# Patient Record
Sex: MALE | Race: WHITE | NOT HISPANIC OR LATINO | Employment: FULL TIME | URBAN - METROPOLITAN AREA
[De-identification: names, ages, dates, MRNs, and addresses within clinical notes are randomized per-mention and may not be internally consistent; named-entity substitution may affect disease eponyms.]

---

## 2017-01-18 RX ORDER — ATORVASTATIN CALCIUM 20 MG/1
TABLET, FILM COATED ORAL
Qty: 90 TABLET | Refills: 0 | Status: SHIPPED | OUTPATIENT
Start: 2017-01-18 | End: 2017-05-03 | Stop reason: SDUPTHER

## 2017-04-17 RX ORDER — CYCLOBENZAPRINE HCL 10 MG
10 TABLET ORAL 3 TIMES DAILY PRN
Qty: 30 TABLET | Refills: 0 | Status: SHIPPED | OUTPATIENT
Start: 2017-04-17 | End: 2017-04-27

## 2017-04-17 RX ORDER — DICLOFENAC SODIUM 75 MG/1
75 TABLET, DELAYED RELEASE ORAL 2 TIMES DAILY
Qty: 30 TABLET | Refills: 0 | Status: SHIPPED | OUTPATIENT
Start: 2017-04-17 | End: 2017-12-06

## 2017-05-04 DIAGNOSIS — E78.5 HYPERLIPIDEMIA, UNSPECIFIED HYPERLIPIDEMIA TYPE: Primary | ICD-10-CM

## 2017-05-04 RX ORDER — ATORVASTATIN CALCIUM 20 MG/1
TABLET, FILM COATED ORAL
Qty: 90 TABLET | Refills: 0 | Status: SHIPPED | OUTPATIENT
Start: 2017-05-04 | End: 2017-07-30 | Stop reason: SDUPTHER

## 2017-08-01 RX ORDER — ATORVASTATIN CALCIUM 20 MG/1
TABLET, FILM COATED ORAL
Qty: 90 TABLET | Refills: 0 | Status: SHIPPED | OUTPATIENT
Start: 2017-08-01 | End: 2017-08-09 | Stop reason: SDUPTHER

## 2017-08-08 LAB
ALBUMIN SERPL-MCNC: 4.2 G/DL (ref 3.6–5.1)
ALBUMIN/GLOB SERPL: 1.6 (CALC) (ref 1–2.5)
ALP SERPL-CCNC: 63 U/L (ref 40–115)
ALT SERPL-CCNC: 29 U/L (ref 9–46)
AST SERPL-CCNC: 21 U/L (ref 10–40)
BILIRUB SERPL-MCNC: 0.9 MG/DL (ref 0.2–1.2)
BUN SERPL-MCNC: 22 MG/DL (ref 7–25)
BUN/CREAT SERPL: ABNORMAL (CALC) (ref 6–22)
CALCIUM SERPL-MCNC: 9.1 MG/DL (ref 8.6–10.3)
CHLORIDE SERPL-SCNC: 103 MMOL/L (ref 98–110)
CHOLEST SERPL-MCNC: 196 MG/DL (ref 125–200)
CHOLEST/HDLC SERPL: 4 (CALC)
CO2 SERPL-SCNC: 29 MMOL/L (ref 20–31)
CREAT SERPL-MCNC: 1.06 MG/DL (ref 0.6–1.35)
GFR SERPL CREATININE-BSD FRML MDRD: 83 ML/MIN/1.73M2
GLOBULIN SER CALC-MCNC: 2.6 G/DL (CALC) (ref 1.9–3.7)
GLUCOSE SERPL-MCNC: 108 MG/DL (ref 65–99)
HDLC SERPL-MCNC: 49 MG/DL
LDLC SERPL CALC-MCNC: 119 MG/DL (CALC)
NONHDLC SERPL-MCNC: 147 MG/DL (CALC)
POTASSIUM SERPL-SCNC: 4.1 MMOL/L (ref 3.5–5.3)
PROT SERPL-MCNC: 6.8 G/DL (ref 6.1–8.1)
SODIUM SERPL-SCNC: 140 MMOL/L (ref 135–146)
TRIGL SERPL-MCNC: 141 MG/DL

## 2017-08-09 RX ORDER — ATORVASTATIN CALCIUM 20 MG/1
20 TABLET, FILM COATED ORAL DAILY
Qty: 90 TABLET | Refills: 3 | Status: SHIPPED | OUTPATIENT
Start: 2017-08-09 | End: 2018-07-05 | Stop reason: DRUGHIGH

## 2017-12-01 RX ORDER — FLUTICASONE PROPIONATE 50 MCG
1 SPRAY, SUSPENSION (ML) NASAL DAILY
Qty: 16 G | Refills: 0 | Status: SHIPPED | OUTPATIENT
Start: 2017-12-01 | End: 2017-12-06

## 2017-12-01 RX ORDER — ALBUTEROL SULFATE 90 UG/1
2 AEROSOL, METERED RESPIRATORY (INHALATION) EVERY 6 HOURS PRN
Qty: 18 G | Refills: 0 | Status: SHIPPED | OUTPATIENT
Start: 2017-12-01 | End: 2018-06-14

## 2017-12-01 RX ORDER — BENZONATATE 200 MG/1
200 CAPSULE ORAL 3 TIMES DAILY PRN
Qty: 30 CAPSULE | Refills: 0 | Status: SHIPPED | OUTPATIENT
Start: 2017-12-01 | End: 2017-12-06

## 2017-12-04 ENCOUNTER — TELEPHONE (OUTPATIENT)
Dept: FAMILY MEDICINE | Facility: CLINIC | Age: 47
End: 2017-12-04

## 2017-12-04 NOTE — TELEPHONE ENCOUNTER
----- Message from Jordan Candelario sent at 12/4/2017  9:46 AM CST -----  Contact: pt's wife  The pt states that he is a past patient of Dr. Whipple.  His medical record does not support him being a patient of Dr. Whipple.  The pt's wife was informed that Dr. Whipple is currently not seeing new patients.  She then requested to have a message sent to the staff.  The pt can be reach [phone]

## 2017-12-04 NOTE — TELEPHONE ENCOUNTER
----- Message from Mathew Toribio sent at 12/4/2017  3:12 PM CST -----  Contact: [phone]  Returning nurse call. Please call anshul at [phone] thx lj

## 2017-12-04 NOTE — TELEPHONE ENCOUNTER
Called pt, no answer or voicemail. Scheduled pt for Dr. Whipple's next available Wednesday 12/13/17 @ 9:00.

## 2017-12-04 NOTE — TELEPHONE ENCOUNTER
Let him know that [facility] currently considers people who dont see me in 3 years as a new patient and since I don't take new patients, it causes problems with booking patients.     Schedule him a new patient appt Wed to see me.

## 2017-12-06 ENCOUNTER — OFFICE VISIT (OUTPATIENT)
Dept: FAMILY MEDICINE | Facility: CLINIC | Age: 47
End: 2017-12-06
Payer: COMMERCIAL

## 2017-12-06 VITALS
WEIGHT: 214.75 LBS | SYSTOLIC BLOOD PRESSURE: 138 MMHG | TEMPERATURE: 99 F | HEIGHT: 68 IN | HEART RATE: 79 BPM | BODY MASS INDEX: 32.55 KG/M2 | DIASTOLIC BLOOD PRESSURE: 93 MMHG

## 2017-12-06 DIAGNOSIS — R03.0 ELEVATED BLOOD PRESSURE READING: ICD-10-CM

## 2017-12-06 DIAGNOSIS — S01.81XA LACERATION OF MULTIPLE SITES OF FACE: ICD-10-CM

## 2017-12-06 DIAGNOSIS — J18.9 PNEUMONIA OF RIGHT LOWER LOBE DUE TO INFECTIOUS ORGANISM: Primary | ICD-10-CM

## 2017-12-06 DIAGNOSIS — S02.2XXD CLOSED FRACTURE OF NASAL BONE WITH ROUTINE HEALING: ICD-10-CM

## 2017-12-06 PROBLEM — E78.2 MIXED HYPERLIPIDEMIA: Status: ACTIVE | Noted: 2017-12-06

## 2017-12-06 PROCEDURE — 94640 AIRWAY INHALATION TREATMENT: CPT | Mod: S$GLB,,, | Performed by: FAMILY MEDICINE

## 2017-12-06 PROCEDURE — 99203 OFFICE O/P NEW LOW 30 MIN: CPT | Mod: S$GLB,,, | Performed by: NURSE PRACTITIONER

## 2017-12-06 PROCEDURE — 99999 PR PBB SHADOW E&M-EST. PATIENT-LVL IV: CPT | Mod: PBBFAC,,, | Performed by: NURSE PRACTITIONER

## 2017-12-06 RX ORDER — HYDROCODONE BITARTRATE AND ACETAMINOPHEN 5; 325 MG/1; MG/1
TABLET ORAL
COMMUNITY
Start: 2017-12-04 | End: 2017-12-06

## 2017-12-06 RX ORDER — PREDNISONE 50 MG/1
TABLET ORAL
COMMUNITY
Start: 2017-12-04 | End: 2017-12-06

## 2017-12-06 RX ORDER — AMOXICILLIN AND CLAVULANATE POTASSIUM 500; 125 MG/1; MG/1
500 TABLET, FILM COATED ORAL
COMMUNITY
Start: 2017-12-05 | End: 2017-12-15

## 2017-12-06 RX ORDER — ALBUTEROL SULFATE 0.63 MG/3ML
0.63 SOLUTION RESPIRATORY (INHALATION)
Status: COMPLETED | OUTPATIENT
Start: 2017-12-06 | End: 2017-12-06

## 2017-12-06 RX ORDER — CEPHALEXIN 500 MG/1
CAPSULE ORAL
Refills: 0 | COMMUNITY
Start: 2017-11-27 | End: 2017-12-06 | Stop reason: ALTCHOICE

## 2017-12-06 RX ORDER — PREDNISONE 50 MG/1
50 TABLET ORAL
COMMUNITY
Start: 2017-12-04 | End: 2017-12-06 | Stop reason: ALTCHOICE

## 2017-12-06 RX ORDER — HYDROCODONE BITARTRATE AND ACETAMINOPHEN 5; 325 MG/1; MG/1
1 TABLET ORAL
COMMUNITY
Start: 2017-12-04 | End: 2017-12-14

## 2017-12-06 RX ORDER — METHYLPREDNISOLONE 4 MG/1
TABLET ORAL
COMMUNITY
Start: 2017-12-05 | End: 2017-12-13 | Stop reason: ALTCHOICE

## 2017-12-06 RX ORDER — AMOXICILLIN AND CLAVULANATE POTASSIUM 500; 125 MG/1; MG/1
TABLET, FILM COATED ORAL
COMMUNITY
Start: 2017-12-05 | End: 2017-12-06 | Stop reason: SDUPTHER

## 2017-12-06 RX ORDER — FLUTICASONE PROPIONATE AND SALMETEROL 250; 50 UG/1; UG/1
1 POWDER RESPIRATORY (INHALATION)
COMMUNITY
Start: 2017-12-05 | End: 2018-06-14

## 2017-12-06 RX ORDER — METHYLPREDNISOLONE 4 MG/1
TABLET ORAL
COMMUNITY
Start: 2017-12-05 | End: 2017-12-06

## 2017-12-06 RX ORDER — FLUTICASONE PROPIONATE AND SALMETEROL 50; 250 UG/1; UG/1
POWDER RESPIRATORY (INHALATION)
COMMUNITY
Start: 2017-12-05 | End: 2017-12-13 | Stop reason: SDUPTHER

## 2017-12-06 RX ORDER — ALBUTEROL SULFATE 0.63 MG/3ML
0.63 SOLUTION RESPIRATORY (INHALATION) EVERY 6 HOURS PRN
Qty: 1 BOX | Refills: 0 | Status: SHIPPED | OUTPATIENT
Start: 2017-12-06 | End: 2018-06-14

## 2017-12-06 RX ORDER — MULTIVITAMIN
1 TABLET ORAL
COMMUNITY

## 2017-12-06 RX ORDER — CEPHALEXIN 500 MG/1
500 CAPSULE ORAL
COMMUNITY
Start: 2017-11-27 | End: 2017-12-06 | Stop reason: ALTCHOICE

## 2017-12-06 RX ORDER — AZITHROMYCIN 250 MG/1
TABLET, FILM COATED ORAL
COMMUNITY
Start: 2017-12-04 | End: 2017-12-13 | Stop reason: ALTCHOICE

## 2017-12-06 RX ADMIN — ALBUTEROL SULFATE 0.63 MG: 0.63 SOLUTION RESPIRATORY (INHALATION) at 09:12

## 2017-12-06 NOTE — PROGRESS NOTES
Subjective:       Patient ID: Rupert Mir is a 46 y.o. male.    Chief Complaint: Follow-up    Pneumonia   He complains of cough, difficulty breathing and wheezing. There is no shortness of breath. This is a new problem. The current episode started 1 to 4 weeks ago. The problem occurs constantly. The problem has been unchanged. The cough is non-productive. Pertinent negatives include no chest pain, ear pain, fever, headaches, myalgias or sore throat. His symptoms are aggravated by any activity. His symptoms are alleviated by nothing. He reports no (advair, z-silvia, prednisone, augmentin) improvement on treatment.   Hypertension   This is a chronic problem. The current episode started more than 1 year ago. The problem is unchanged. Condition status: 140/80-90. Pertinent negatives include no blurred vision, chest pain, headaches, palpitations or shortness of breath. Past treatments include nothing.   Facial Injury    Incident onset: 12/3/17 syncopal episode, fell hitting face on wood floor. He lost consciousness for a period of less than one minute. The volume of blood lost was moderate. The pain is mild. Pertinent negatives include no blurred vision, disorientation, headaches, memory loss, numbness or vomiting. Associated symptoms comments: Laceration to nose and above right eyebrow, nasal fracture. Treatments tried: seen by ENT.   Loss of Consciousness   This is a new problem. Episode onset: 12/3/17. He lost consciousness for a period of less than 1 minute. Exacerbated by: pt had a very deep cough while standing from a chair and passed out  Pertinent negatives include no abdominal pain, chest pain, dizziness, fever, headaches, nausea, palpitations or vomiting. Treatments tried: seen in ER, diagnosed with vagal reaction.       Review of Systems   Constitutional: Negative for fatigue, fever and unexpected weight change.   HENT: Positive for congestion and facial swelling ( nasal fracture). Negative for ear  pain and sore throat.    Eyes: Negative.  Negative for blurred vision, pain and visual disturbance.   Respiratory: Positive for cough and wheezing. Negative for shortness of breath.    Cardiovascular: Positive for syncope. Negative for chest pain and palpitations.   Gastrointestinal: Negative for abdominal pain, diarrhea, nausea and vomiting.   Genitourinary: Negative for dysuria and frequency.   Musculoskeletal: Negative for arthralgias and myalgias.   Skin: Positive for wound. Negative for color change and rash.   Neurological: Negative for dizziness, numbness and headaches.   Psychiatric/Behavioral: Negative for memory loss and sleep disturbance. The patient is not nervous/anxious.        Vitals:    12/06/17 0803   BP: (!) 138/93   Pulse: 79   Temp: 98.6 °F (37 °C)       Objective:     Current Outpatient Prescriptions   Medication Sig Dispense Refill    albuterol 90 mcg/actuation inhaler Inhale 2 puffs into the lungs every 6 (six) hours as needed. 18 g 0    atorvastatin (LIPITOR) 20 MG tablet Take 1 tablet (20 mg total) by mouth once daily. 90 tablet 3    fluticasone-salmeterol 250-50 mcg/dose (ADVAIR) 250-50 mcg/dose diskus inhaler Inhale 1 puff into the lungs.      multivitamin (THERAGRAN) per tablet Take 1 tablet by mouth.      albuterol (ACCUNEB) 0.63 mg/3 mL Nebu Take 3 mLs (0.63 mg total) by nebulization every 6 (six) hours as needed. Rescue 1 Box 0    methylPREDNISolone (MEDROL DOSEPACK) 4 mg tablet follow package directions 21 tablet 0     No current facility-administered medications for this visit.        Physical Exam   Constitutional: He is oriented to person, place, and time. He appears well-developed. No distress.   HENT:   Head: Normocephalic. Head is with raccoon's eyes.   Eyes: EOM are normal. Pupils are equal, round, and reactive to light.   Neck: Normal range of motion. Neck supple.   Cardiovascular: Normal rate and regular rhythm.    Pulmonary/Chest: Effort normal. He has wheezes in the  right upper field, the right middle field and the right lower field. He has rhonchi in the right lower field. He has rales in the right lower field.   Musculoskeletal: Normal range of motion.   Neurological: He is alert and oriented to person, place, and time.   Skin: Skin is warm and dry. Laceration ( to face) noted. No rash noted.   Psychiatric: He has a normal mood and affect. Thought content normal.   Nursing note and vitals reviewed.      Assessment:       1. Pneumonia of right lower lobe due to infectious organism    2. Elevated blood pressure reading    3. Closed fracture of nasal bone with routine healing    4. Laceration of multiple sites of face        Plan:   Pneumonia of right lower lobe due to infectious organism  -     albuterol nebulizer solution 0.63 mg; Take 3 mLs (0.63 mg total) by nebulization one time.    Elevated blood pressure reading    Closed fracture of nasal bone with routine healing    Laceration of multiple sites of face    Other orders  -     albuterol (ACCUNEB) 0.63 mg/3 mL Nebu; Take 3 mLs (0.63 mg total) by nebulization every 6 (six) hours as needed. Rescue  Dispense: 1 Box; Refill: 0    continue current meds  Keep appointment for follow up with ENT    Return if symptoms worsen or fail to improve.

## 2017-12-13 ENCOUNTER — OFFICE VISIT (OUTPATIENT)
Dept: FAMILY MEDICINE | Facility: CLINIC | Age: 47
End: 2017-12-13
Payer: COMMERCIAL

## 2017-12-13 VITALS
TEMPERATURE: 98 F | SYSTOLIC BLOOD PRESSURE: 135 MMHG | HEART RATE: 88 BPM | HEIGHT: 68 IN | BODY MASS INDEX: 32.61 KG/M2 | DIASTOLIC BLOOD PRESSURE: 95 MMHG | WEIGHT: 215.19 LBS | OXYGEN SATURATION: 95 %

## 2017-12-13 DIAGNOSIS — J20.8 ACUTE BRONCHITIS DUE TO OTHER SPECIFIED ORGANISMS: Primary | ICD-10-CM

## 2017-12-13 DIAGNOSIS — E66.9 OBESITY (BMI 30-39.9): ICD-10-CM

## 2017-12-13 DIAGNOSIS — E78.2 MIXED HYPERLIPIDEMIA: ICD-10-CM

## 2017-12-13 DIAGNOSIS — R03.0 ELEVATED BLOOD PRESSURE READING IN OFFICE WITHOUT DIAGNOSIS OF HYPERTENSION: ICD-10-CM

## 2017-12-13 DIAGNOSIS — S02.2XXD CLOSED FRACTURE OF NASAL BONE WITH ROUTINE HEALING: ICD-10-CM

## 2017-12-13 PROCEDURE — 99999 PR PBB SHADOW E&M-EST. PATIENT-LVL IV: CPT | Mod: PBBFAC,,, | Performed by: FAMILY MEDICINE

## 2017-12-13 PROCEDURE — 90686 IIV4 VACC NO PRSV 0.5 ML IM: CPT | Mod: S$GLB,,, | Performed by: FAMILY MEDICINE

## 2017-12-13 PROCEDURE — 99214 OFFICE O/P EST MOD 30 MIN: CPT | Mod: 25,S$GLB,, | Performed by: FAMILY MEDICINE

## 2017-12-13 PROCEDURE — 90471 IMMUNIZATION ADMIN: CPT | Mod: S$GLB,,, | Performed by: FAMILY MEDICINE

## 2017-12-13 RX ORDER — METHYLPREDNISOLONE 4 MG/1
TABLET ORAL
Qty: 21 TABLET | Refills: 0 | Status: SHIPPED | OUTPATIENT
Start: 2017-12-13 | End: 2018-01-19

## 2017-12-13 NOTE — PATIENT INSTRUCTIONS
The Dash diet has been shown to help people lower blood pressures and weight.  Copy and go to the link below to learn more about the diet and to use the helpful links from the [employer] to learn more about how to implement the diet.     [email or web address]-diet-[email or web address]-control-high-blood-[email or web address]

## 2017-12-13 NOTE — PROGRESS NOTES
Subjective:      Patient ID: Rupert Mir is a 46 y.o. male.    Chief Complaint: f/u from ER visit.    HPI     He went to the ER after having a cough and syncope and a fracture of the nose with a laceration recetnly and he was seen at [facility].     ED Provider Notes - Gianluca Salcido MD - 12/04/2017 12:27 AM CST  Associated Order(s): ED LACERATION REPAIR; ED LACERATION REPAIR    Formatting of this note may be different from the original.    Triage Note Reviewed    History     Chief Complaint   Patient presents with    Fall    Laceration     History of Present IllnessChaquiles Mir is a 46 y.o. male with no significant medical history, presenting with an episode of passing out.    Patient has been sick for the last week with an upper respiratory infection. He was seen in a walking clinic and prescribed an antibiotic which she's been taking. He thought he was getting better yesterday but then today it worsening cough. He was sitting in a chair and had a coughing fit and during the coughing fit he passed out and fell forward. He doesn't think that he lost consciousness and he immediately stood up. His wife witnessed it and agrees with the history provided. Patient never had chest pain or shortness of breath. He's been using albuterol at home and has been wheezing for the last several days. He was not given any steroids.    I reviewed the patient's medical chart which shows recent seen in clinic about a week ago, given Keflex for an upper respiratory infection.    History provided by patient and his wife    The pain is located in his nose and head. Pain is 7/10 and throbbing in character. The pain radiates across his head. Nothing makes it better. Coughing makes it worse. There are associated symptoms of cough, congestion, fever, syncope.     At least 4 HPI elements are present above     Review of Systems   Constitutional: Positive for activity change, diaphoresis, fatigue and fever. Negative for  "appetite change.   HENT: Positive for congestion. Negative for drooling.   Eyes: Negative for discharge and redness.   Respiratory: Positive for cough, shortness of breath and wheezing. Negative for chest tightness.   Cardiovascular: Negative for chest pain and palpitations.   Gastrointestinal: Negative for abdominal pain, nausea and vomiting.   Genitourinary: Negative for dysuria, flank pain and frequency.   Musculoskeletal: Negative for back pain and neck pain.   Skin: Negative for rash and wound.   Neurological: Positive for syncope. Negative for dizziness, seizures and headaches.   Psychiatric/Behavioral: Negative for agitation and behavioral problems.         No Known Allergies    Past Medical History:   Diagnosis Date    Hyperlipidemia     No past surgical history on file.     Family History   Problem Relation Age of Onset    No Known Problems Mother    No Known Problems Father     Social History   Substance Use Topics    Smoking status: Never Smoker    Smokeless tobacco: Never Used    Alcohol use Yes   Comment: Rare use     Physical Exam     Visit Vitals  BP (!) 167/109 (BP Location: Right arm, Patient Position: Sitting)   Pulse 85   Temp 98.8 °F (37.1 °C) (Oral)   Resp 17   Ht 5' 8" (1.727 m)   Wt 215 lb (97.5 kg)   SpO2 100%   BMI 32.69 kg/m²     Physical Exam   Constitutional: He is oriented to person, place, and time. He appears well-developed and well-nourished. No distress.   HENT:   Head: Normocephalic.   Right Ear: External ear normal.   Left Ear: External ear normal.   Nose: Nose normal.   Mouth/Throat: Oropharynx is clear and moist. No oropharyngeal exudate.   4cm laceration to right frontal scalp just above the eyebrow, small 0.5 cm laceration to nasal bridge   Eyes: Conjunctivae and EOM are normal. Pupils are equal, round, and reactive to light. Right eye exhibits no discharge. Left eye exhibits no discharge.   EOMs intact, no proptosis, pupils equal round reactive to light. No hyphema. No " eyelid laceration   Neck: Normal range of motion. Neck supple.   No midline cervical tenderness, full range of motion in the neck   Cardiovascular: Normal rate, regular rhythm, normal heart sounds and intact distal pulses. Exam reveals no gallop and no friction rub.   No murmur heard.  Pulmonary/Chest: Effort normal. No stridor. No respiratory distress. He has wheezes. He has rales. He exhibits no tenderness.   Effusion wheezes but good air movement throughout, rales in the right lung base. No respiratory distress. No chest or muscle use. Normal respiratory rate   Abdominal: Soft. He exhibits no distension and no mass. There is no tenderness. There is no rebound and no guarding.   Musculoskeletal: Normal range of motion. He exhibits no edema, tenderness or deformity.   Neurological: He is alert and oriented to person, place, and time. No cranial nerve deficit. He exhibits normal muscle tone.   Skin: Skin is warm and dry. No rash noted. He is not diaphoretic. No erythema. No pallor.   Psychiatric: He has a normal mood and affect. His behavior is normal.   Nursing note and vitals reviewed.    ED Course     Labs Reviewed   CBC WITH DIFFERENTIAL - Abnormal; Notable for the following:   Result Value   WBC 11.9 (*)   Neutrophils Absolute 7.5 (*)   All other components within normal limits   BMP - Abnormal; Notable for the following:   Glucose 124 (*)   Calcium 8.6 (*)   All other components within normal limits   TROPONIN I   GLOMERULAR FILTRATION RATE     Lab Results for last 36Hrs:  Recent Results (from the past 36 hour(s))   CBC with Differential   Collection Time: 12/03/17 11:30 PM   Result Value Ref Range   WBC 11.9 (H) 4.8 - 10.8 10*3/uL   RBC 5.11 4.70 - 6.10 10*6/uL   HGB 14.6 14.0 - 18.0 g/dL   HCT 43.9 42.0 - 52.0 %   MCV 85.9 80.0 - 94.0 fL   MCH 28.6 27.0 - 31.0 pg   MCHC 33.3 33.0 - 37.0 g/dL   RDW 13.1 11.5 - 14.5 %   Platelet Count 269 130 - 400 10*3/uL   MPV 7.6 7.4 - 10.4 fL   Neutrophils Percent 63.0  36.0 - 66.0 %   Lymphocytes Percent 24.0 21.0 - 50.0 %   Monocytes Percent 8.0 2.0 - 10.0 %   Eosinophils Percent 4.0 0.0 - 10.0 %   Basophils Percent 1.0 0.0 - 1.0 %   Neutrophils Absolute 7.5 (H) 1.4 - 6.5 10*3/uL   Lymphocytes Absolute 2.9 1.2 - 3.4 10*3/uL   Monocytes Absolute 0.9 0.1 - 1.0 10*3/uL   Eosinophils Absolute 0.5 0.0 - 0.7 10*3/uL   Basophils Absolute 0.1 0.0 - 0.2 10*3/uL   Basic metabolic panel   Collection Time: 12/03/17 11:30 PM   Result Value Ref Range   Glucose 124 (H) 65 - 99 mg/dL   Sodium 138 136 - 144 mmol/L   Potassium 3.7 3.6 - 5.1 mmol/L   Chloride 104 101 - 111 mmol/L   CO2 26 22 - 32 mmol/L   BUN 15 8 - 20 mg/dL   Calcium 8.6 (L) 8.9 - 10.3 mg/dL   Creatinine 1.19 0.90 - 1.30 mg/dL   Anion Gap 8 7 - 16 mmol/L   Troponin I   Collection Time: 12/03/17 11:30 PM   Result Value Ref Range   Troponin I <0.03 0.00 - 0.04 ng/mL   Glomerular Filtration Rate   Collection Time: 12/03/17 11:30 PM   Result Value Ref Range   GFR Non African American >60 >59 mL/min   GFR African American >60 >59 mL/min     Diagnostic Results for last 36Hrs:  No results found.    Wet Read Results  XR Chest AP Portable (Results Pending)   CT Head WO Contrast (Results Pending)   CT Maxillofacial WO Contrast (Results Pending)     Laceration  Date/Time: 12/4/2017 12:40 AM  Performed by: [employer]  Authorized by: [employer]     Anesthesia (see MAR for exact dosages):   Anesthesia method: Local infiltration  Local anesthetic: Lidocaine 2% w/o epi  Sedation:   Patient sedated?: No   Laceration details:   Location: Scalp  Scalp location: Frontal  Length (cm): 4  Depth (mm): 1  Repair type:   Repair type: Simple  Pre-procedure details:   Preparation: Patient was prepped and draped in usual sterile fashion  Exploration:   Hemostasis achieved with: Direct pressure  Wound exploration: wound explored through full range of motion and entire depth of wound probed and visualized   Wound extent: no areolar tissue violation  noted, no fascia violation noted, no foreign bodies/material noted, no muscle damage noted, no nerve damage noted, no tendon damage noted, no underlying fracture noted and no vascular damage noted   Contaminated: no   Treatment:   Area cleansed with: Betadine  Amount of cleaning: Standard  Irrigation solution: Sterile saline  Irrigation volume: 250cc  Irrigation method: Syringe and pressure wash  Visualized foreign bodies/material removed: no   Skin repair:   Repair method: Sutures  Suture size: 4-0  Suture material: Nylon  Suture technique: Simple interrupted  Number of sutures: 7  Approximation:   Approximation: Close  Post-procedure details:   Dressing: Antibiotic ointment  Patient tolerance of procedure: Tolerated well, no immediate complications  Laceration  Date/Time: 12/4/2017 12:42 AM  Performed by: [employer]  Authorized by: [employer]     Anesthesia (see MAR for exact dosages):   Anesthesia method: Local infiltration  Local anesthetic: Lidocaine 2% w/o epi  Sedation:   Patient sedated?: No   Laceration details:   Location: Face  Face location: Nose  Length (cm): 0.5  Depth (mm): 1  Repair type:   Repair type: Simple  Pre-procedure details:   Preparation: Patient was prepped and draped in usual sterile fashion  Exploration:   Hemostasis achieved with: Direct pressure  Wound exploration: wound explored through full range of motion and entire depth of wound probed and visualized   Wound extent: no areolar tissue violation noted, no fascia violation noted, no foreign bodies/material noted, no muscle damage noted, no nerve damage noted, no tendon damage noted, no underlying fracture noted and no vascular damage noted   Contaminated: no   Treatment:   Area cleansed with: Betadine  Amount of cleaning: Standard  Irrigation solution: Sterile saline  Irrigation volume: 250  Irrigation method: Pressure wash and syringe  Visualized foreign bodies/material removed: no   Skin repair:   Repair method:  Sutures  Suture size: 4-0  Suture material: Nylon  Suture technique: Simple interrupted  Number of sutures: 2  Approximation:   Approximation: Close  Post-procedure details:   Dressing: Antibiotic ointment  Patient tolerance of procedure: Tolerated well, no immediate complications    ED Course     MDM  Number of Diagnoses or Management Options  Closed fracture of nasal bone, initial encounter: new and requires workup  Closed fracture of nasal septum, initial encounter: new and requires workup  Community acquired pneumonia of right lower lobe of lung: new and requires workup  Facial laceration, initial encounter: new and requires workup  Fall in home, initial encounter: new and requires workup  Vasovagal syncope: new and requires workup  Diagnosis management comments: Rupert Mir is a 46 y.o. male presenting with syncopal episode following a coughing fit most concerning for vasovagal syncope. Patient without history of CHF, no history of a structurally abnormal heart, patient very low risk from a cardiovascular standpoint in regards to syncope. Patient had no chest pain or shortness of breath during the episode. No concern for ACS. PERC negative for PE. Patient's wife very concerned about intracranial hemorrhage as well as nasal bone fracture, I explained that this is low risk given the mechanism and explained that things necessary but she remained very concerned so we'll get a CT his head and facial bones. I will repair the laceration. Tetanus will be given. We will do EKG and labs for syncope although the history is very consistent with vasovagal. Patient with wheezing on exam, ongoing upper respiratory illness, patient with rales in his right lung base concerning for pneumonia. We'll do chest x-ray to assess for pneumonia. Duo nebs and Solu-Medrol for wheezing. Reassess following initial workup.    I independently interpreted the patient's lab results which showed mild leukocytosis but expected with  ongoing URI.     I independently interpreted the patient's ECG which showed NSR, rate 86, nl axis, no hypertrophy, nl ST segments, nl T-waves. Normal ecg    No evidence of short SC or slurred initial QRS demonstrating WPW  No ST segment changes in V1-V3 suggestive of Brugada syndrome.  No significant prolonged QT suggestive of congenital or acquired prolonged QT syndromes  No Q waves in a septal distribution suggestive of HOCM  No epsilon waves in V1-V3 suggestive of ARVD    A pulse oximetry was measured and was recorded as 100% which is normal    I have personally visualized and interpreted the patient's CXR and determined that it demonstrates no signs of cardiomegaly, costophrenic blunting, hyperinflation, pulmonary infiltrates, pneumothorax, or tracheal deviation. Cardiac silhouette is well visualized. Julio-diaphragms clear. Bony structures without break or fractures.     CT head negative. CT facial bones with nasal bone and nasal septum fracture. Nondisplaced. Chest x-ray without focal consolidation but patient clinically concerning for pneumonia with rales on exam focally. He was treated with a suboptimal antibiotic choice with Keflex. We'll treat with steroids and azithromycin. Norco for nasal bone fracture. Patient with much improveed wheezing following treatment in the emergency department. Given follow-up with primary care physician this week.      Amount and/or Complexity of Data Reviewed  Clinical lab tests: ordered and reviewed  Tests in the radiology section of CPT®: ordered and reviewed  Tests in the medicine section of CPT®: ordered and reviewed  Decide to obtain previous medical records or to obtain history from someone other than the patient: yes  Obtain history from someone other than the patient: yes  Review and summarize past medical records: yes  Independent visualization of images, tracings, or specimens: yes    ED Critical Care Time    Diagnosis:    Final diagnoses:   Fall in home, initial  encounter   Facial laceration, initial encounter   Vasovagal syncope   Community acquired pneumonia of right lower lobe of lung   Closed fracture of nasal septum, initial encounter   Closed fracture of nasal bone, initial encounter       Gianluca Salcido MD  12/04/17 0119      Back to top of Miscellaneous Notes  ED Triage Notes - Ruthie Mcdermott, RN - 12/03/2017 11:11 PM CST  Arrived to main ED per EMS from home.  Was DX with URI 24 th.  Just PTA pt was coughing hard when he fell forward hitting his head on the floor.   Wife reports that he jumped up with out assist.  Unknown LOC.  AAO X 4 on arrival to ED    Back to top of Miscellaneous Notes  ED Notes - Isra Beth RN - 12/03/2017 10:52 PM CST  Bed: 14  Expected date: 12/3/17  Expected time: 10:40 PM  Means of arrival: Ambulance  Comments:  Unit [address] 46M Syncope    Back to top of Miscellaneous Notes      Plan of Treatment  - as of this encounter    Not on file      Procedures  - in this encounter      Procedure Name Priority Date/Time Associated Diagnosis Comments   AK BEDSIDE PROCEDURE DUMMY CHARGE Routine 12/04/2017 12:27 AM CST   Results for this procedure are in the results section.     AK BEDSIDE PROCEDURE DUMMY CHARGE Routine 12/04/2017 12:27 AM CST   Results for this procedure are in the results section.     ECG 12-LEAD STAT 12/03/2017 11:26 PM CST         Lab Results  - in this encounter      Table of Contents for Lab Results  Glomerular Filtration Rate (12/03/2017 11:30 PM)  Troponin I (12/03/2017 11:30 PM)  Basic metabolic panel (12/03/2017 11:30 PM)  CBC with Differential (12/03/2017 11:30 PM)       Glomerular Filtration Rate (12/03/2017 11:30 PM)  Glomerular Filtration Rate (12/03/2017 11:30 PM)   Component Value Ref Range   GFR Non African American >60 >59 mL/min   GFR  >60  Comment:                STAGES OF CHRONIC KIDNEY DISEASE  STAGE          DESCRIPTION           GFR(mL/min/1.73 m2)  3         Moderate decrease  GFR            30-59  4           Severe decrease GFR            15-29  5              Kidney Failure         <15 (or dialysis)  Chronic kidney disease is defined as either kidney damage  or GFR <60mL/min/1.73 m2 for >=3 months. Kidney damage is  defined as pathologic abnormalities or markers of damage,  including abnormalities in blood or urine tests or imaging  studies.   >59 mL/min     Glomerular Filtration Rate (12/03/2017 11:30 PM)   Specimen Performing Laboratory   N/A [facility]     Back to top of Lab Results      Troponin I (12/03/2017 11:30 PM)  Troponin I (12/03/2017 11:30 PM)   Component Value Ref Range   Troponin I <0.03  Comment:   Troponin I values of >0.49 ng/mL are recommended for diagnosis  of Acute Myocardial Infarction, as this yields optimal  performance of sensitivity and specificity.  After a myocardial infarction, cardiac Troponin I increases  quickly and peaks (12-10 hours), then returns to normal in  4-9 days.  Any condition resulting in myocardial cell damage  can potentially elevate cardiac Troponin I levels - i.e.  Unstable Angina, CHF, Myocarditis, Cardiac Surgery, or  invasive testing.  Interpret Troponin I results in the light  of the total clinical history, Troponin I results from serial  samples, additional lab tests, ECG's, etc.   0.00 - 0.04 ng/mL     Troponin I (12/03/2017 11:30 PM)   Specimen Performing Laboratory   Blood [facility]     Back to top of Lab Results      Basic metabolic panel (12/03/2017 11:30 PM)  Basic metabolic panel (12/03/2017 11:30 PM)   Component Value Ref Range   Glucose 124 (H) 65 - 99 mg/dL   Sodium 138 136 - 144 mmol/L   Potassium 3.7 3.6 - 5.1 mmol/L   Chloride 104 101 - 111 mmol/L   CO2 26 22 - 32 mmol/L   BUN 15 8 - 20 mg/dL   Calcium 8.6 (L) 8.9 - 10.3 mg/dL   Creatinine 1.19 0.90 - 1.30 mg/dL   Anion Gap 8 7 - 16 mmol/L     Basic metabolic panel (12/03/2017 11:30 PM)   Specimen Performing Laboratory   N/A - Blood [facility]     Back to top of Lab  Results      CBC with Differential (12/03/2017 11:30 PM)  CBC with Differential (12/03/2017 11:30 PM)   Component Value Ref Range   WBC 11.9 (H) 4.8 - 10.8 10*3/uL   RBC 5.11 4.70 - 6.10 10*6/uL   HGB 14.6 14.0 - 18.0 g/dL   HCT 43.9 42.0 - 52.0 %   MCV 85.9 80.0 - 94.0 fL   MCH 28.6 27.0 - 31.0 pg   MCHC 33.3 33.0 - 37.0 g/dL   RDW 13.1 11.5 - 14.5 %   Platelet Count 269 130 - 400 10*3/uL   MPV 7.6 7.4 - 10.4 fL   Neutrophils Percent 63.0 36.0 - 66.0 %   Lymphocytes Percent 24.0 21.0 - 50.0 %   Monocytes Percent 8.0 2.0 - 10.0 %   Eosinophils Percent 4.0 0.0 - 10.0 %   Basophils Percent 1.0 0.0 - 1.0 %   Neutrophils Absolute 7.5 (H) 1.4 - 6.5 10*3/uL   Lymphocytes Absolute 2.9 1.2 - 3.4 10*3/uL   Monocytes Absolute 0.9 0.1 - 1.0 10*3/uL   Eosinophils Absolute 0.5 0.0 - 0.7 10*3/uL   Basophils Absolute 0.1 0.0 - 0.2 10*3/uL     CBC with Differential (12/03/2017 11:30 PM)   Specimen Performing Laboratory   Blood [facility]     Back to top of Lab Results      Imaging Results  - in this encounter      Table of Contents for Imaging Results  CT Maxillofacial WO Contrast (12/04/2017 12:31 AM)  CT Head WO Contrast (12/04/2017 12:30 AM)  XR Chest AP Portable (12/03/2017 11:34 PM)       CT Maxillofacial WO Contrast (12/04/2017 12:31 AM)  CT Maxillofacial WO Contrast (12/04/2017 12:31 AM)   Specimen Performing Laboratory     [facility] RADIOLOGY       CT Maxillofacial WO Contrast (12/04/2017 12:31 AM)   Narrative   REASON FOR EXAM: trauma         TECHNICAL FACTORS: Multiple contiguous axial CT images were obtained of the facial bones without administration of intravenous contrast. 2D reformatted images were performed.        COMPARISON: None        FINDINGS: There is right preorbital and frontal scalp soft tissue swelling. Orbital rims appear intact. There is minimal cortical discontinuity of the anterior mid aspect of the nasal septum. There is minimal angulation and depression of the left nasal     alae. The inferior  maxillary spine appears intact. Zygomatic arches appear intact. Paranasal sinuses are aerated. The globes and retrobulbar areas appear unremarkable.        IMPRESSION:    1.  Nasal alae are fracture, presumably acute.    2. Acute fracture of the nasal septum, minimally displaced.            Code A. Preliminary report by [facility] 12/4/2017 at 0049 hours.        Electronically signed by Ariel Shanks MD on 12/4/2017 9:11 AM               CT Maxillofacial WO Contrast (12/04/2017 12:31 AM)   Procedure Note   Interface, Rad Results In - 12/04/2017 9:14 AM CST    REASON FOR EXAM: trauma     TECHNICAL FACTORS: Multiple contiguous axial CT images were obtained of the facial bones without administration of intravenous contrast. 2D reformatted images were performed.    COMPARISON: None    FINDINGS: There is right preorbital and frontal scalp soft tissue swelling. Orbital rims appear intact. There is minimal cortical discontinuity of the anterior mid aspect of the nasal septum. There is minimal angulation and depression of the left nasal alae. The inferior maxillary spine appears intact. Zygomatic arches appear intact. Paranasal sinuses are aerated. The globes and retrobulbar areas appear unremarkable.    IMPRESSION:  1. Nasal alae are fracture, presumably acute.  2. Acute fracture of the nasal septum, minimally displaced.     Code A. Preliminary report by [facility] 12/4/2017 at 0049 hours.    Electronically signed by Ariel Shanks MD on 12/4/2017 9:11 AM         Back to top of Imaging Results      CT Head WO Contrast (12/04/2017 12:30 AM)  CT Head WO Contrast (12/04/2017 12:30 AM)   Specimen Performing Laboratory     [facility] RADIOLOGY       CT Head WO Contrast (12/04/2017 12:30 AM)   Impressions   1.  No intracranial abnormality is identified.    2. Right frontal scalp and preorbital soft tissue swelling.    3. Possible left nasal alae and nasal septal fractures.        Code A. Preliminary report by [facility] 12/4/2017 at 0051  hours.        Electronically signed by Ariel Shanks MD on 12/4/2017 9:07 AM               CT Head WO Contrast (12/04/2017 12:30 AM)   Narrative   REASON FOR EXAM: head         TECHNICAL FACTORS: 5 mm contiguous axial CT images were obtained from the foramen magnum to the skull vertex.         COMPARISON: None        FINDINGS: The ventricular system is within normal limits. Cortical sulci are within normal limits. The white-gray matter interface is preserved. No focal white or gray matter abnormality is identified. No intracranial hemorrhage is identified.        The calvarium appears intact. There is right frontal scalp soft tissue swelling extending into the right preorbital area. There is minimal angulation of the left nasal alae. The anterior aspect of the nasal septum displays minimal cortical discontinuity.     Paranasal sinuses are aerated. Orbital contents appear unremarkable.           CT Head WO Contrast (12/04/2017 12:30 AM)   Procedure Note   Interface, Rad Results In - 12/04/2017 9:10 AM CST    REASON FOR EXAM: head     TECHNICAL FACTORS: 5 mm contiguous axial CT images were obtained from the foramen magnum to the skull vertex.     COMPARISON: None    FINDINGS: The ventricular system is within normal limits. Cortical sulci are within normal limits. The white-gray matter interface is preserved. No focal white or gray matter abnormality is identified. No intracranial hemorrhage is identified.    The calvarium appears intact. There is right frontal scalp soft tissue swelling extending into the right preorbital area. There is minimal angulation of the left nasal alae. The anterior aspect of the nasal septum displays minimal cortical discontinuity. Paranasal sinuses are aerated. Orbital contents appear unremarkable.    IMPRESSION:   1. No intracranial abnormality is identified.  2. Right frontal scalp and preorbital soft tissue swelling.  3. Possible left nasal alae and nasal septal fractures.    Code A.  Preliminary report by [facility] 12/4/2017 at 0051 hours.    Electronically signed by Ariel Shanks MD on 12/4/2017 9:07 AM         Back to top of Imaging Results      XR Chest AP Portable (12/03/2017 11:34 PM)  XR Chest AP Portable (12/03/2017 11:34 PM)   Specimen Performing Laboratory     [facility] RADIOLOGY       XR Chest AP Portable (12/03/2017 11:34 PM)   Impressions       No acute findings.                 Approved by DADA Vega on 12/4/2017 9:20 AM        Electronically signed by Quoc Bansal MD on 12/4/2017 12:49 PM               XR Chest AP Portable (12/03/2017 11:34 PM)   Narrative   REASON FOR EXAM: cough         TECHNICAL FACTORS:  One view.        COMPARISON: None        FINDINGS: The lungs are clear. The cardiac silhouette is normal in size. The pulmonary vasculature is within normal limits.  There is no evidence of a pleural effusion or pneumothorax. Osseous structures are unremarkable.               XR Chest AP Portable (12/03/2017 11:34 PM)   Procedure Note   Interface, Rad Results In - 12/04/2017 12:52 PM CST    REASON FOR EXAM: cough     TECHNICAL FACTORS: One view.    COMPARISON: None    FINDINGS: The lungs are clear. The cardiac silhouette is normal in size. The pulmonary vasculature is within normal limits. There is no evidence of a pleural effusion or pneumothorax. Osseous structures are unremarkable.     IMPRESSION:   No acute findings.       Approved by DADA Vega on 12/4/2017 9:20 AM    Electronically signed by Quoc Bansal MD on 12/4/2017 12:49 PM               He still has a cough and a little wheezing. He has completed his steroids and is finishing the augmentin.  He is done with the steroids.      The patient presents with possible essential hypertension.  The patient has had an elevated blood pressure when it has been checked recently.  Denies chest pain, palpitations, shortness of breath, dyspnea on exertion, left arm or neck pain, nausea, vomiting,  diaphoresis, PND and orthopnea.  Nothing makes this worse or better.    He has been on steroids. I discussed the dash diet and low sodium diets.      Problem List Items Addressed This Visit     Acute bronchitis due to other specified organisms    Closed fracture of nasal bone with routine healing    Overview     Vagal response with fall, nondisplaced nasal fracture, routine follow up with Yecenia Singh - 12/2017         Mixed hyperlipidemia - Primary    Obesity (BMI 30-39.9)    Overview     The patient has obesity.    The current BMI is There is no height or weight on file to calculate BMI.  Based on this, exercise and calorie restriction are indicated.             Current Assessment & Plan     Increase exercise to 150 minutes of walking per week along with a calorie restricted diet of around 1500 calories per day.             Health Maintenance Due   Topic Date Due    TETANUS VACCINE  12/27/1986    Influenza Vaccine  08/01/2017       Past Medical History:  Past Medical History:   Diagnosis Date    Primary hypercholesterolemia      Past Surgical History:   Procedure Laterality Date    bulging disks       Review of patient's allergies indicates:   Allergen Reactions    No known drug allergies      Current Outpatient Prescriptions on File Prior to Visit   Medication Sig Dispense Refill    ADVAIR DISKUS 250-50 mcg/dose diskus inhaler       albuterol (ACCUNEB) 0.63 mg/3 mL Nebu Take 3 mLs (0.63 mg total) by nebulization every 6 (six) hours as needed. Rescue 1 Box 0    albuterol 90 mcg/actuation inhaler Inhale 2 puffs into the lungs every 6 (six) hours as needed. 18 g 0    amoxicillin-clavulanate 500-125mg (AUGMENTIN) 500-125 mg Tab Take 500 mg by mouth.      atorvastatin (LIPITOR) 20 MG tablet Take 1 tablet (20 mg total) by mouth once daily. 90 tablet 3    azithromycin (Z-HILARIO) 250 MG tablet       fluticasone-salmeterol 250-50 mcg/dose (ADVAIR) 250-50 mcg/dose diskus inhaler Inhale 1 puff into the lungs.    "   hydrocodone-acetaminophen 5-325mg (NORCO) 5-325 mg per tablet Take 1 tablet by mouth.      methylPREDNISolone (MEDROL DOSEPACK) 4 mg tablet       multivitamin (THERAGRAN) per tablet Take 1 tablet by mouth.       No current facility-administered medications on file prior to visit.      Social History     Social History    Marital status:      Spouse name: N/A    Number of children: N/A    Years of education: N/A     Occupational History    Not on file.     Social History Main Topics    Smoking status: Former Smoker    Smokeless tobacco: Former User    Alcohol use Not on file    Drug use: Unknown    Sexual activity: Not on file     Other Topics Concern    Not on file     Social History Narrative    No narrative on file     History reviewed. No pertinent family history.          Review of Systems   Constitutional: Negative.  Negative for chills, diaphoresis and fever.   HENT: Negative for congestion, hearing loss, mouth sores, postnasal drip and sore throat.    Eyes: Negative for pain and visual disturbance.   Respiratory: Positive for cough and wheezing. Negative for chest tightness and shortness of breath.    Cardiovascular: Negative for chest pain.   Gastrointestinal: Negative for abdominal pain, anal bleeding, blood in stool, constipation, diarrhea, nausea and vomiting.   Genitourinary: Negative for dysuria and hematuria.   Musculoskeletal: Negative for back pain, neck pain and neck stiffness.   Skin: Negative for rash.   Neurological: Negative for dizziness and weakness.       Objective:   BP (!) 135/95   Pulse 88   Temp 98.3 °F (36.8 °C) (Oral)   Ht 5' 8" (1.727 m)   Wt 97.6 kg (215 lb 2.7 oz)   SpO2 95%   BMI 32.72 kg/m²     Physical Exam   Constitutional: He is oriented to person, place, and time. He appears well-developed and well-nourished.   HENT:   Head: Normocephalic and atraumatic.   Right Ear: External ear normal.   Left Ear: External ear normal.   Nose: Nose normal. "   Mouth/Throat: Oropharynx is clear and moist. No oropharyngeal exudate.   Eyes: Conjunctivae and EOM are normal. Pupils are equal, round, and reactive to light. Right eye exhibits no discharge. Left eye exhibits no discharge. No scleral icterus.   Neck: Normal range of motion. Neck supple. No JVD present. No thyromegaly present.   Cardiovascular: Normal rate, regular rhythm, normal heart sounds and intact distal pulses.  Exam reveals no gallop and no friction rub.    No murmur heard.  Pulmonary/Chest: Effort normal. No respiratory distress. He has wheezes. He has no rales. He exhibits no tenderness.   Abdominal: Soft. Bowel sounds are normal. He exhibits no distension and no mass. There is no tenderness. There is no rebound and no guarding.   Musculoskeletal: Normal range of motion. He exhibits no edema or tenderness.   Lymphadenopathy:     He has no cervical adenopathy.   Neurological: He is alert and oriented to person, place, and time. No cranial nerve deficit. Coordination normal.   Skin: Skin is warm and dry. He is not diaphoretic.        Psychiatric: He has a normal mood and affect.       Assessment:     1. Acute bronchitis due to other specified organisms    2. Mixed hyperlipidemia    3. Obesity (BMI 30-39.9)    4. Closed fracture of nasal bone with routine healing    5. Elevated blood pressure reading in office without diagnosis of hypertension        Plan:     Problem List Items Addressed This Visit     Acute bronchitis due to other specified organisms - Primary    Relevant Medications    methylPREDNISolone (MEDROL DOSEPACK) 4 mg tablet    Closed fracture of nasal bone with routine healing    Mixed hyperlipidemia    Obesity (BMI 30-39.9)     Increase exercise to 150 minutes of walking per week along with a calorie restricted diet of around 1500 calories per day.           Other Visit Diagnoses     Elevated blood pressure reading in office without diagnosis of hypertension          use the dash diet and  recheck him in 4 weeks with me for a bp check.  Use a low fat diet also.   Cont f/u with the ENT as needed.

## 2017-12-13 NOTE — ASSESSMENT & PLAN NOTE
Increase exercise to 150 minutes of walking per week along with a calorie restricted diet of around 1500 calories per day.

## 2018-01-19 ENCOUNTER — OFFICE VISIT (OUTPATIENT)
Dept: FAMILY MEDICINE | Facility: CLINIC | Age: 48
End: 2018-01-19
Payer: COMMERCIAL

## 2018-01-19 VITALS
HEART RATE: 76 BPM | TEMPERATURE: 99 F | BODY MASS INDEX: 34.17 KG/M2 | SYSTOLIC BLOOD PRESSURE: 138 MMHG | WEIGHT: 225.44 LBS | DIASTOLIC BLOOD PRESSURE: 95 MMHG | HEIGHT: 68 IN

## 2018-01-19 DIAGNOSIS — J20.8 ACUTE BRONCHITIS DUE TO OTHER SPECIFIED ORGANISMS: ICD-10-CM

## 2018-01-19 DIAGNOSIS — I10 ESSENTIAL HYPERTENSION: ICD-10-CM

## 2018-01-19 DIAGNOSIS — J30.89 ACUTE ALLERGIC RHINITIS DUE TO OTHER ALLERGEN, UNSPECIFIED SEASONALITY: Primary | ICD-10-CM

## 2018-01-19 PROCEDURE — 99999 PR PBB SHADOW E&M-EST. PATIENT-LVL III: CPT | Mod: PBBFAC,,, | Performed by: FAMILY MEDICINE

## 2018-01-19 PROCEDURE — 99213 OFFICE O/P EST LOW 20 MIN: CPT | Mod: S$GLB,,, | Performed by: FAMILY MEDICINE

## 2018-01-19 RX ORDER — MINERAL OIL
180 ENEMA (ML) RECTAL DAILY
Qty: 10 TABLET | Refills: 0
Start: 2018-01-19 | End: 2018-06-14

## 2018-01-19 RX ORDER — AMLODIPINE BESYLATE 5 MG/1
5 TABLET ORAL DAILY
Qty: 30 TABLET | Refills: 11 | Status: SHIPPED | OUTPATIENT
Start: 2018-01-19 | End: 2018-02-19

## 2018-01-19 RX ORDER — FLUTICASONE PROPIONATE 50 MCG
SPRAY, SUSPENSION (ML) NASAL
Qty: 16 G | Refills: 0 | Status: SHIPPED | OUTPATIENT
Start: 2018-01-19 | End: 2022-08-29

## 2018-01-19 NOTE — PROGRESS NOTES
"Subjective:      Patient ID: Rupert Mir is a 47 y.o. male.    Chief Complaint: Follow-up    HPItierra has had a persistent cough for over a month and he has gotten off of his inhalers (advair and albuterol) but he does have a drip now from the back of his nose.  He has had no fever or chills.      He is also f/u on his bp.  He has not been on meds that would increase this.      Review of Systems    Objective:   BP (!) 138/95   Pulse 76   Temp 98.7 °F (37.1 °C) (Oral)   Ht 5' 8" (1.727 m)   Wt 102.3 kg (225 lb 6.7 oz)   BMI 34.28 kg/m²     Physical Exam   Constitutional: He appears well-developed and well-nourished.   HENT:   Head: Normocephalic and atraumatic.   Right Ear: External ear normal.   Left Ear: External ear normal.   Nose: Mucosal edema and rhinorrhea present.   Mouth/Throat: Oropharynx is clear and moist. No oropharyngeal exudate.   Eyes: Conjunctivae and EOM are normal. Right eye exhibits no discharge. Left eye exhibits no discharge. No scleral icterus.   Neck: Normal range of motion. Neck supple. No thyromegaly present.   Cardiovascular: Normal rate, regular rhythm and normal heart sounds.  Exam reveals no gallop and no friction rub.    No murmur heard.  Pulmonary/Chest: Effort normal and breath sounds normal. No respiratory distress. He has no wheezes. He has no rales. He exhibits no tenderness.   Musculoskeletal: He exhibits no edema.   Lymphadenopathy:     He has no cervical adenopathy.       Assessment:     1. Acute allergic rhinitis due to other allergen, unspecified seasonality    2. Essential hypertension    3. Acute bronchitis due to other specified organisms        Plan:   Rupert was seen today for follow-up.    Diagnoses and all orders for this visit:    Acute allergic rhinitis due to other allergen, unspecified seasonality  -     fexofenadine (ALLEGRA ALLERGY) 180 MG tablet; Take 1 tablet (180 mg total) by mouth once daily.  -     fluticasone (FLONASE) 50 mcg/actuation " nasal spray; Use 2 puffs in each nostril q day.    Essential hypertension  -     amLODIPine (NORVASC) 5 MG tablet; Take 1 tablet (5 mg total) by mouth once daily.    Acute bronchitis due to other specified organisms        Resume the advair for now.

## 2018-01-22 ENCOUNTER — TELEPHONE (OUTPATIENT)
Dept: FAMILY MEDICINE | Facility: CLINIC | Age: 48
End: 2018-01-22

## 2018-01-22 NOTE — TELEPHONE ENCOUNTER
Pt saw you in clinic on Friday. Throat getting worse. Would like to know if you can send him something in to his pharmacy.

## 2018-01-22 NOTE — TELEPHONE ENCOUNTER
----- Message from Dexter Weiner sent at 1/22/2018  2:03 PM CST -----  Contact: pt   States he was in on Friday and now his throat is hurting worse and can hardley speak and wants to discuss on having something called in and can be reached at [phone]//thanks/dbw

## 2018-01-24 ENCOUNTER — OFFICE VISIT (OUTPATIENT)
Dept: FAMILY MEDICINE | Facility: CLINIC | Age: 48
End: 2018-01-24
Payer: COMMERCIAL

## 2018-01-24 VITALS
SYSTOLIC BLOOD PRESSURE: 132 MMHG | WEIGHT: 219.13 LBS | BODY MASS INDEX: 33.21 KG/M2 | HEART RATE: 104 BPM | DIASTOLIC BLOOD PRESSURE: 92 MMHG | TEMPERATURE: 99 F | HEIGHT: 68 IN

## 2018-01-24 DIAGNOSIS — J02.9 PHARYNGITIS, UNSPECIFIED ETIOLOGY: Primary | ICD-10-CM

## 2018-01-24 DIAGNOSIS — J06.9 VIRAL UPPER RESPIRATORY TRACT INFECTION WITH COUGH: ICD-10-CM

## 2018-01-24 PROCEDURE — 99999 PR PBB SHADOW E&M-EST. PATIENT-LVL III: CPT | Mod: PBBFAC,,, | Performed by: NURSE PRACTITIONER

## 2018-01-24 PROCEDURE — 96372 THER/PROPH/DIAG INJ SC/IM: CPT | Mod: S$GLB,,, | Performed by: FAMILY MEDICINE

## 2018-01-24 PROCEDURE — 99213 OFFICE O/P EST LOW 20 MIN: CPT | Mod: 25,S$GLB,, | Performed by: NURSE PRACTITIONER

## 2018-01-24 RX ORDER — METHYLPREDNISOLONE ACETATE 40 MG/ML
40 INJECTION, SUSPENSION INTRA-ARTICULAR; INTRALESIONAL; INTRAMUSCULAR; SOFT TISSUE
Status: COMPLETED | OUTPATIENT
Start: 2018-01-24 | End: 2018-01-24

## 2018-01-24 RX ORDER — AMOXICILLIN AND CLAVULANATE POTASSIUM 875; 125 MG/1; MG/1
1 TABLET, FILM COATED ORAL EVERY 12 HOURS
Qty: 20 TABLET | Refills: 0 | Status: SHIPPED | OUTPATIENT
Start: 2018-01-24 | End: 2018-02-19

## 2018-01-24 RX ADMIN — METHYLPREDNISOLONE ACETATE 40 MG: 40 INJECTION, SUSPENSION INTRA-ARTICULAR; INTRALESIONAL; INTRAMUSCULAR; SOFT TISSUE at 10:01

## 2018-01-24 NOTE — PROGRESS NOTES
Subjective:       Patient ID: Rupert Mir is a 47 y.o. male.    Chief Complaint: Sore Throat and Fever    Sore Throat    This is a new problem. The current episode started in the past 7 days. The problem has been gradually worsening. The maximum temperature recorded prior to his arrival was 100.4 - 100.9 F. The fever has been present for 1 to 2 days. The pain is severe. Associated symptoms include a hoarse voice, a plugged ear sensation, swollen glands and trouble swallowing. Pertinent negatives include no abdominal pain, coughing, diarrhea, ear pain, headaches, shortness of breath or vomiting. He has tried acetaminophen and NSAIDs (allegra) for the symptoms. The treatment provided no relief.   Fever    This is a new problem. The current episode started in the past 7 days. The problem occurs intermittently. The problem has been waxing and waning. The maximum temperature noted was 100 to 100.9 F. The temperature was taken using an oral thermometer. Associated symptoms include a sore throat. Pertinent negatives include no abdominal pain, chest pain, coughing, diarrhea, ear pain, headaches, nausea, rash, urinary pain or vomiting.       Review of Systems   Constitutional: Positive for fever. Negative for fatigue and unexpected weight change.   HENT: Positive for hoarse voice, sore throat and trouble swallowing. Negative for ear pain.    Eyes: Negative.  Negative for pain and visual disturbance.   Respiratory: Negative for cough and shortness of breath.    Cardiovascular: Negative for chest pain and palpitations.   Gastrointestinal: Negative for abdominal pain, diarrhea, nausea and vomiting.   Genitourinary: Negative for dysuria and frequency.   Musculoskeletal: Negative for arthralgias and myalgias.   Skin: Negative for color change and rash.   Neurological: Negative for dizziness and headaches.   Psychiatric/Behavioral: Negative for sleep disturbance. The patient is not nervous/anxious.        Vitals:     01/24/18 0942   BP: (!) 132/92   Pulse: 104   Temp: 98.9 °F (37.2 °C)       Objective:     Current Outpatient Prescriptions   Medication Sig Dispense Refill    albuterol (ACCUNEB) 0.63 mg/3 mL Nebu Take 3 mLs (0.63 mg total) by nebulization every 6 (six) hours as needed. Rescue 1 Box 0    albuterol 90 mcg/actuation inhaler Inhale 2 puffs into the lungs every 6 (six) hours as needed. 18 g 0    amLODIPine (NORVASC) 5 MG tablet Take 1 tablet (5 mg total) by mouth once daily. 30 tablet 11    atorvastatin (LIPITOR) 20 MG tablet Take 1 tablet (20 mg total) by mouth once daily. 90 tablet 3    fexofenadine (ALLEGRA ALLERGY) 180 MG tablet Take 1 tablet (180 mg total) by mouth once daily. 10 tablet 0    fluticasone (FLONASE) 50 mcg/actuation nasal spray Use 2 puffs in each nostril q day. 16 g 0    fluticasone-salmeterol 250-50 mcg/dose (ADVAIR) 250-50 mcg/dose diskus inhaler Inhale 1 puff into the lungs.      multivitamin (THERAGRAN) per tablet Take 1 tablet by mouth.      amoxicillin-clavulanate 875-125mg (AUGMENTIN) 875-125 mg per tablet Take 1 tablet by mouth every 12 (twelve) hours. 20 tablet 0     No current facility-administered medications for this visit.        Physical Exam   Constitutional: He is oriented to person, place, and time. He appears well-developed. No distress.   HENT:   Head: Normocephalic and atraumatic.   Right Ear: A middle ear effusion is present.   Left Ear: A middle ear effusion is present.   Nose: Rhinorrhea present.   Mouth/Throat: Posterior oropharyngeal edema ( post nasal mucus) present.   Eyes: EOM are normal. Pupils are equal, round, and reactive to light.   Neck: Normal range of motion. Neck supple.   Cardiovascular: Normal rate and regular rhythm.    Pulmonary/Chest: Effort normal and breath sounds normal.   Dry cough   Musculoskeletal: Normal range of motion.   Neurological: He is alert and oriented to person, place, and time.   Skin: Skin is warm and dry. No rash noted.    Psychiatric: He has a normal mood and affect. Thought content normal.   Nursing note and vitals reviewed.      Assessment:       1. Pharyngitis, unspecified etiology    2. Viral upper respiratory tract infection with cough        Plan:   Pharyngitis, unspecified etiology  -     amoxicillin-clavulanate 875-125mg (AUGMENTIN) 875-125 mg per tablet; Take 1 tablet by mouth every 12 (twelve) hours.  Dispense: 20 tablet; Refill: 0  -     methylPREDNISolone acetate injection 40 mg; Inject 1 mL (40 mg total) into the muscle one time.    Viral upper respiratory tract infection with cough  -     methylPREDNISolone acetate injection 40 mg; Inject 1 mL (40 mg total) into the muscle one time.        Follow-up if symptoms worsen or fail to improve.

## 2018-02-19 ENCOUNTER — OFFICE VISIT (OUTPATIENT)
Dept: FAMILY MEDICINE | Facility: CLINIC | Age: 48
End: 2018-02-19
Payer: COMMERCIAL

## 2018-02-19 VITALS
HEIGHT: 68 IN | BODY MASS INDEX: 33.01 KG/M2 | HEART RATE: 86 BPM | WEIGHT: 217.81 LBS | DIASTOLIC BLOOD PRESSURE: 92 MMHG | SYSTOLIC BLOOD PRESSURE: 130 MMHG | TEMPERATURE: 99 F

## 2018-02-19 DIAGNOSIS — I10 ESSENTIAL HYPERTENSION: Primary | ICD-10-CM

## 2018-02-19 PROCEDURE — 3008F BODY MASS INDEX DOCD: CPT | Mod: S$GLB,,, | Performed by: FAMILY MEDICINE

## 2018-02-19 PROCEDURE — 99213 OFFICE O/P EST LOW 20 MIN: CPT | Mod: S$GLB,,, | Performed by: FAMILY MEDICINE

## 2018-02-19 PROCEDURE — 99999 PR PBB SHADOW E&M-EST. PATIENT-LVL III: CPT | Mod: PBBFAC,,, | Performed by: FAMILY MEDICINE

## 2018-02-19 RX ORDER — AMLODIPINE BESYLATE 10 MG/1
10 TABLET ORAL DAILY
Qty: 30 TABLET | Refills: 11 | Status: SHIPPED | OUTPATIENT
Start: 2018-02-19 | End: 2018-06-14 | Stop reason: ALTCHOICE

## 2018-02-19 NOTE — PROGRESS NOTES
"Subjective:   Rupert Mir is a 47 y.o. male with hypertension.  He is tolerating the medication well and is in excellent compliance.  The patient is experiencing no side effects.  Counseling was offered regarding low salt diets.  He has a reduced salt intake.  He denies chest pain, palpitations, shortness of breath, dyspnea on exertion, left or murmur neck pain, nausea, vomiting, diaphoresis, paroxysmal nocturnal dyspnea, and orthopnea.   The patient's Health Maintenance was reviewed and the following appears to be due at this time:  There are no preventive care reminders to display for this patient.    Outpatient Medications Prior to Visit   Medication Sig Dispense Refill    albuterol (ACCUNEB) 0.63 mg/3 mL Nebu Take 3 mLs (0.63 mg total) by nebulization every 6 (six) hours as needed. Rescue 1 Box 0    albuterol 90 mcg/actuation inhaler Inhale 2 puffs into the lungs every 6 (six) hours as needed. 18 g 0    atorvastatin (LIPITOR) 20 MG tablet Take 1 tablet (20 mg total) by mouth once daily. 90 tablet 3    fluticasone (FLONASE) 50 mcg/actuation nasal spray Use 2 puffs in each nostril q day. 16 g 0    fluticasone-salmeterol 250-50 mcg/dose (ADVAIR) 250-50 mcg/dose diskus inhaler Inhale 1 puff into the lungs.      multivitamin (THERAGRAN) per tablet Take 1 tablet by mouth.      amLODIPine (NORVASC) 5 MG tablet Take 1 tablet (5 mg total) by mouth once daily. 30 tablet 11    fexofenadine (ALLEGRA ALLERGY) 180 MG tablet Take 1 tablet (180 mg total) by mouth once daily. 10 tablet 0    amoxicillin-clavulanate 875-125mg (AUGMENTIN) 875-125 mg per tablet Take 1 tablet by mouth every 12 (twelve) hours. 20 tablet 0     No facility-administered medications prior to visit.         Objective:   BP (!) 130/92   Pulse 86   Temp 98.8 °F (37.1 °C) (Oral)   Ht 5' 8" (1.727 m)   Wt 98.8 kg (217 lb 13 oz)   BMI 33.12 kg/m²    Body mass index is 33.12 kg/m².  Genl:Alert and oriented in NAD.   Cardiovascular: " Normal rate, regular rhythm, S1 normal, S2 normal and normal heart sounds.  Exam reveals no gallop, no S3, no S4 and no friction rub.    No murmur heard.  Pulmonary/Chest: Effort normal and breath sounds normal. No stridor. Not tachypneic. No respiratory distress. The patient has no wheezes. The patient has no rhonchi. The patient has no rales.   Skin: The patient is not diaphoretic.     Encounter Diagnosis   Name Primary?    Essential hypertension Yes       PLAN:    I have discontinued Mr. Mir's amLODIPine and amoxicillin-clavulanate 875-125mg. I am also having him start on amLODIPine. Additionally, I am having him maintain his atorvastatin, albuterol, multivitamin, fluticasone-salmeterol 250-50 mcg/dose, albuterol, fexofenadine, and fluticasone.    Rupert was seen today for follow-up.    Diagnoses and all orders for this visit:    Essential hypertension    Other orders  -     amLODIPine (NORVASC) 10 MG tablet; Take 1 tablet (10 mg total) by mouth once daily.        Medications Ordered This Encounter      amLODIPine (NORVASC) 10 MG tablet          Sig: Take 1 tablet (10 mg total) by mouth once daily.          Dispense:  30 tablet          Refill:  11  No orders of the defined types were placed in this encounter.    Use a low salt diet.   Exercise and diet enough to keep the BMI less than 30.  rtc in 1 mo for a recheck.

## 2018-06-14 ENCOUNTER — OFFICE VISIT (OUTPATIENT)
Dept: FAMILY MEDICINE | Facility: CLINIC | Age: 48
End: 2018-06-14
Payer: COMMERCIAL

## 2018-06-14 VITALS
HEART RATE: 89 BPM | BODY MASS INDEX: 33.49 KG/M2 | DIASTOLIC BLOOD PRESSURE: 85 MMHG | HEIGHT: 68 IN | SYSTOLIC BLOOD PRESSURE: 124 MMHG | TEMPERATURE: 98 F | WEIGHT: 221 LBS

## 2018-06-14 DIAGNOSIS — I10 ESSENTIAL HYPERTENSION: Primary | ICD-10-CM

## 2018-06-14 PROCEDURE — 3008F BODY MASS INDEX DOCD: CPT | Mod: CPTII,S$GLB,, | Performed by: NURSE PRACTITIONER

## 2018-06-14 PROCEDURE — 99214 OFFICE O/P EST MOD 30 MIN: CPT | Mod: S$GLB,,, | Performed by: NURSE PRACTITIONER

## 2018-06-14 PROCEDURE — 99999 PR PBB SHADOW E&M-EST. PATIENT-LVL III: CPT | Mod: PBBFAC,,, | Performed by: NURSE PRACTITIONER

## 2018-06-14 PROCEDURE — 3074F SYST BP LT 130 MM HG: CPT | Mod: CPTII,S$GLB,, | Performed by: NURSE PRACTITIONER

## 2018-06-14 PROCEDURE — 3079F DIAST BP 80-89 MM HG: CPT | Mod: CPTII,S$GLB,, | Performed by: NURSE PRACTITIONER

## 2018-06-14 RX ORDER — NEBIVOLOL 10 MG/1
10 TABLET ORAL DAILY
Qty: 30 TABLET | Refills: 0 | Status: SHIPPED | OUTPATIENT
Start: 2018-06-14 | End: 2018-08-10

## 2018-06-14 NOTE — PROGRESS NOTES
Subjective:       Patient ID: Rupert Mir is a 47 y.o. male.    Chief Complaint: Joint Swelling and Fatigue    Hypertension   This is a chronic problem. The current episode started more than 1 month ago. The problem is unchanged. The problem is controlled. Associated symptoms include peripheral edema. Pertinent negatives include no chest pain, headaches, palpitations or shortness of breath. Risk factors for coronary artery disease include male gender, obesity and family history. Past treatments include calcium channel blockers. Compliance problems include exercise and diet.        Review of Systems   Constitutional: Negative for fatigue, fever and unexpected weight change.   HENT: Negative for ear pain and sore throat.    Eyes: Negative.  Negative for pain and visual disturbance.   Respiratory: Negative for cough and shortness of breath.    Cardiovascular: Positive for leg swelling. Negative for chest pain and palpitations.   Gastrointestinal: Negative for abdominal pain, diarrhea, nausea and vomiting.   Genitourinary: Negative for dysuria and frequency.   Musculoskeletal: Negative for arthralgias and myalgias.   Skin: Negative for color change and rash.   Neurological: Negative for dizziness and headaches.   Psychiatric/Behavioral: Negative for sleep disturbance. The patient is not nervous/anxious.        Vitals:    06/14/18 1556   BP: 124/85   Pulse: 89   Temp: 98.4 °F (36.9 °C)       Objective:     Current Outpatient Prescriptions   Medication Sig Dispense Refill    atorvastatin (LIPITOR) 20 MG tablet Take 1 tablet (20 mg total) by mouth once daily. 90 tablet 3    fluticasone (FLONASE) 50 mcg/actuation nasal spray Use 2 puffs in each nostril q day. 16 g 0    multivitamin (THERAGRAN) per tablet Take 1 tablet by mouth.      nebivolol (BYSTOLIC) 10 MG Tab Take 1 tablet (10 mg total) by mouth once daily. 30 tablet 0     No current facility-administered medications for this visit.        Physical Exam    Constitutional: He is oriented to person, place, and time. He appears well-developed. No distress.   HENT:   Head: Normocephalic and atraumatic.   Eyes: EOM are normal. Pupils are equal, round, and reactive to light.   Neck: Normal range of motion. Neck supple.   Cardiovascular: Normal rate and regular rhythm.    Bilateral ankles and feet with 2+ edema   Pulmonary/Chest: Effort normal and breath sounds normal.   Musculoskeletal: Normal range of motion.   Neurological: He is alert and oriented to person, place, and time.   Skin: Skin is warm and dry. No rash noted.   Psychiatric: He has a normal mood and affect. Thought content normal.   Nursing note and vitals reviewed.      Assessment:       1. Essential hypertension        Plan:   Essential hypertension  -     Comprehensive metabolic panel; Future; Expected date: 06/14/2018  -     CBC auto differential; Future; Expected date: 06/14/2018  -     Lipid panel; Future; Expected date: 06/14/2018    Other orders  -     nebivolol (BYSTOLIC) 10 MG Tab; Take 1 tablet (10 mg total) by mouth once daily.  Dispense: 30 tablet; Refill: 0        Follow-up in about 3 weeks (around 7/5/2018) for repeat blood pressure, Reevaluation, labs.    There are no Patient Instructions on file for this visit.

## 2018-06-15 ENCOUNTER — TELEPHONE (OUTPATIENT)
Dept: FAMILY MEDICINE | Facility: CLINIC | Age: 48
End: 2018-06-15

## 2018-06-15 DIAGNOSIS — I10 ESSENTIAL HYPERTENSION: Primary | ICD-10-CM

## 2018-06-15 NOTE — TELEPHONE ENCOUNTER
Called patient and notified him that orders were sent to [facility] and await Gabino's signature.

## 2018-06-15 NOTE — TELEPHONE ENCOUNTER
----- Message from Sahci Macias sent at 6/15/2018  7:24 AM CDT -----  Pt is requesting a call from nurse to have lab orders sent to [facility] in [place].          Please call pt back at [phone]

## 2018-06-15 NOTE — TELEPHONE ENCOUNTER
----- Message from Mable Cheng sent at 6/15/2018  1:46 PM CDT -----  Contact: pt  He's calling stating that orders were supposed to be sent to [facility] but they have not been sent yet and he is there waiting, please advise [phone]

## 2018-06-15 NOTE — TELEPHONE ENCOUNTER
----- Message from James Varela sent at 6/15/2018  9:27 AM CDT -----  Contact: PT  Call pt at [phone]  Regarding having lab orders sent [facility]

## 2018-06-16 LAB
ALBUMIN SERPL-MCNC: 4.4 G/DL (ref 3.6–5.1)
ALBUMIN/GLOB SERPL: 1.6 (CALC) (ref 1–2.5)
ALP SERPL-CCNC: 69 U/L (ref 40–115)
ALT SERPL-CCNC: 55 U/L (ref 9–46)
AST SERPL-CCNC: 28 U/L (ref 10–40)
BASOPHILS # BLD AUTO: 50 CELLS/UL (ref 0–200)
BASOPHILS NFR BLD AUTO: 0.7 %
BILIRUB SERPL-MCNC: 0.9 MG/DL (ref 0.2–1.2)
BUN SERPL-MCNC: 19 MG/DL (ref 7–25)
BUN/CREAT SERPL: ABNORMAL (CALC) (ref 6–22)
CALCIUM SERPL-MCNC: 9.6 MG/DL (ref 8.6–10.3)
CHLORIDE SERPL-SCNC: 101 MMOL/L (ref 98–110)
CHOLEST SERPL-MCNC: 232 MG/DL
CHOLEST/HDLC SERPL: 3.9 (CALC)
CO2 SERPL-SCNC: 32 MMOL/L (ref 20–31)
CREAT SERPL-MCNC: 0.94 MG/DL (ref 0.6–1.35)
EOSINOPHIL # BLD AUTO: 270 CELLS/UL (ref 15–500)
EOSINOPHIL NFR BLD AUTO: 3.8 %
ERYTHROCYTE [DISTWIDTH] IN BLOOD BY AUTOMATED COUNT: 13.1 % (ref 11–15)
GFR SERPL CREATININE-BSD FRML MDRD: 95 ML/MIN/1.73M2
GLOBULIN SER CALC-MCNC: 2.8 G/DL (CALC) (ref 1.9–3.7)
GLUCOSE SERPL-MCNC: 90 MG/DL (ref 65–99)
HCT VFR BLD AUTO: 44.6 % (ref 38.5–50)
HDLC SERPL-MCNC: 60 MG/DL
HGB BLD-MCNC: 14.9 G/DL (ref 13.2–17.1)
LDLC SERPL CALC-MCNC: 150 MG/DL (CALC)
LYMPHOCYTES # BLD AUTO: 2201 CELLS/UL (ref 850–3900)
LYMPHOCYTES NFR BLD AUTO: 31 %
MCH RBC QN AUTO: 29.4 PG (ref 27–33)
MCHC RBC AUTO-ENTMCNC: 33.4 G/DL (ref 32–36)
MCV RBC AUTO: 88 FL (ref 80–100)
MONOCYTES # BLD AUTO: 660 CELLS/UL (ref 200–950)
MONOCYTES NFR BLD AUTO: 9.3 %
NEUTROPHILS # BLD AUTO: 3919 CELLS/UL (ref 1500–7800)
NEUTROPHILS NFR BLD AUTO: 55.2 %
NONHDLC SERPL-MCNC: 172 MG/DL (CALC)
PLATELET # BLD AUTO: 254 THOUSAND/UL (ref 140–400)
PMV BLD REES-ECKER: 10 FL (ref 7.5–12.5)
POTASSIUM SERPL-SCNC: 4.3 MMOL/L (ref 3.5–5.3)
PROT SERPL-MCNC: 7.2 G/DL (ref 6.1–8.1)
RBC # BLD AUTO: 5.07 MILLION/UL (ref 4.2–5.8)
SODIUM SERPL-SCNC: 139 MMOL/L (ref 135–146)
TRIGL SERPL-MCNC: 108 MG/DL
WBC # BLD AUTO: 7.1 THOUSAND/UL (ref 3.8–10.8)

## 2018-07-05 ENCOUNTER — TELEPHONE (OUTPATIENT)
Dept: FAMILY MEDICINE | Facility: CLINIC | Age: 48
End: 2018-07-05

## 2018-07-05 RX ORDER — ATORVASTATIN CALCIUM 40 MG/1
40 TABLET, FILM COATED ORAL DAILY
Qty: 90 TABLET | Refills: 3 | Status: SHIPPED | OUTPATIENT
Start: 2018-07-05 | End: 2018-08-10

## 2018-07-05 NOTE — TELEPHONE ENCOUNTER
----- Message from Gabino Diaz NP sent at 7/5/2018  8:41 AM CDT -----  Lipids have increased, recommend increasing Atorvastatin to 40 mg daily  Repeat lipids and LFT in 3-4 months  Healthy diet choices with increased cardiovascular activity

## 2018-07-05 NOTE — TELEPHONE ENCOUNTER
Called and shared lab results and recommendations with the pat. Pending new dosage atvorstatin orders.

## 2018-07-05 NOTE — TELEPHONE ENCOUNTER
----- Message from Sachi Macias sent at 7/5/2018  4:21 PM CDT -----  Pt is requesting a call from nurse to review lab result.      Please call pt back a [phone]

## 2018-08-10 ENCOUNTER — OFFICE VISIT (OUTPATIENT)
Dept: FAMILY MEDICINE | Facility: CLINIC | Age: 48
End: 2018-08-10
Payer: COMMERCIAL

## 2018-08-10 VITALS
DIASTOLIC BLOOD PRESSURE: 75 MMHG | BODY MASS INDEX: 33.95 KG/M2 | SYSTOLIC BLOOD PRESSURE: 120 MMHG | HEART RATE: 90 BPM | TEMPERATURE: 98 F | HEIGHT: 68 IN | WEIGHT: 224 LBS

## 2018-08-10 DIAGNOSIS — R53.83 FATIGUE, UNSPECIFIED TYPE: ICD-10-CM

## 2018-08-10 DIAGNOSIS — I10 ESSENTIAL HYPERTENSION: ICD-10-CM

## 2018-08-10 DIAGNOSIS — E78.2 MIXED HYPERLIPIDEMIA: Primary | ICD-10-CM

## 2018-08-10 DIAGNOSIS — R68.82 DECREASED LIBIDO: ICD-10-CM

## 2018-08-10 DIAGNOSIS — R60.9 EDEMA, UNSPECIFIED TYPE: ICD-10-CM

## 2018-08-10 PROCEDURE — 99999 PR PBB SHADOW E&M-EST. PATIENT-LVL III: CPT | Mod: PBBFAC,,, | Performed by: FAMILY MEDICINE

## 2018-08-10 PROCEDURE — 3074F SYST BP LT 130 MM HG: CPT | Mod: CPTII,S$GLB,, | Performed by: FAMILY MEDICINE

## 2018-08-10 PROCEDURE — 3008F BODY MASS INDEX DOCD: CPT | Mod: CPTII,S$GLB,, | Performed by: FAMILY MEDICINE

## 2018-08-10 PROCEDURE — 3078F DIAST BP <80 MM HG: CPT | Mod: CPTII,S$GLB,, | Performed by: FAMILY MEDICINE

## 2018-08-10 PROCEDURE — 99214 OFFICE O/P EST MOD 30 MIN: CPT | Mod: S$GLB,,, | Performed by: FAMILY MEDICINE

## 2018-08-10 RX ORDER — ATORVASTATIN CALCIUM 20 MG/1
20 TABLET, FILM COATED ORAL DAILY
Qty: 90 TABLET | Refills: 3 | Status: SHIPPED | OUTPATIENT
Start: 2018-08-10 | End: 2019-09-02 | Stop reason: SDUPTHER

## 2018-08-10 RX ORDER — AMLODIPINE BESYLATE 10 MG/1
10 TABLET ORAL DAILY
Qty: 90 TABLET | Refills: 3 | Status: SHIPPED | OUTPATIENT
Start: 2018-08-10 | End: 2019-09-02 | Stop reason: SDUPTHER

## 2018-08-13 NOTE — PROGRESS NOTES
Subjective:      Patient ID: Rupert Mir is a 47 y.o. male.    Chief Complaint: Follow-up    Problem List Items Addressed This Visit     Decreased libido    Overview     He has had increased problems with his libido for the last few months.  Nothing changed besides his other symptoms reported with the other issues today.  He states that he has not had erection issues.  Nothing makes it worse or better.           Relevant Orders    Testosterone, free    Edema    Overview     Edema: Patient complains of edema. The location of the edema is lower leg(s) bilateral.  The edema has been mild.  Onset of symptoms was 2 months ago, unchanged since that time. The edema is present in the afternoon. The patient states he never had this in the past.  The swelling has been aggravated by dependency of involved area, relieved by elevation of involved area, and been associated with nothing. Cardiac risk factors include hypertension and male gender.             Relevant Orders    Brain natriuretic peptide    D dimer, quantitative    TSH    Essential hypertension    Overview     The patient presents with essential hypertension.  The patient is tolerating the medication well and is in excellent compliance.  The patient is experiencing no side effects.  Counseling was offered regarding low salt diets.  The patient has a reduced salt intake.  The patient denies chest pain, palpitations, shortness of breath, dyspnea on exertion, left or murmur neck pain, nausea, vomiting, diaphoresis, paroxysmal nocturnal dyspnea, and orthopnea.   Hypertension Medications             amLODIPine (NORVASC) 10 MG tablet Take 1 tablet (10 mg total) by mouth once daily.                 Fatigue    Overview     He has experienced more fatigue than usual.  He has not had any chest pain with exertion or dyspnea on exertion.  He just feels more tired earlier in the day than usual.           Mixed hyperlipidemia - Primary    Overview     The patient  presents with hyperlipidemia.  The patient reports tolerating the medication well and is in excellent compliance.  There have been no medication side effects.  The patient denies chest pain, neuropathy, and myalgias.  The patient has reduced fat intake and has been exercising.  Current treatment has included the medications listed in the med card.    Lab Results   Component Value Date    CHOL 232 (H) 06/15/2018    CHOL 196 08/08/2017    CHOL 172 05/11/2012       Lab Results   Component Value Date    HDL 60 06/15/2018    HDL 49 08/08/2017    HDL 37 (L) 05/11/2012       Lab Results   Component Value Date    LDLCALC 150 (H) 06/15/2018    LDLCALC 119 08/08/2017    LDLCALC 117.0 05/11/2012       Lab Results   Component Value Date    TRIG 108 06/15/2018    TRIG 141 08/08/2017    TRIG 91 05/11/2012       Lab Results   Component Value Date    CHOLHDL 3.9 06/15/2018    CHOLHDL 4.0 08/08/2017    CHOLHDL 21.5 05/11/2012     Lab Results   Component Value Date    ALT 55 (H) 06/15/2018    AST 28 06/15/2018    ALKPHOS 69 06/15/2018    BILITOT 0.9 06/15/2018              Relevant Medications    atorvastatin (LIPITOR) 20 MG tablet          Past Medical History:  Past Medical History:   Diagnosis Date    Broken nose 12/2017    Pneumonia 12/2017    Primary hypercholesterolemia     Syncope, vasovagal 12/2017     Past Surgical History:   Procedure Laterality Date    bulging disks       Review of patient's allergies indicates:   Allergen Reactions    No known drug allergies      Current Outpatient Medications on File Prior to Visit   Medication Sig Dispense Refill    fluticasone (FLONASE) 50 mcg/actuation nasal spray Use 2 puffs in each nostril q day. 16 g 0    multivitamin (THERAGRAN) per tablet Take 1 tablet by mouth.       No current facility-administered medications on file prior to visit.      Social History     Socioeconomic History    Marital status:      Spouse name: Not on file    Number of children: Not on file  "   Years of education: Not on file    Highest education level: Not on file   Social Needs    Financial resource strain: Not on file    Food insecurity - worry: Not on file    Food insecurity - inability: Not on file    Transportation needs - medical: Not on file    Transportation needs - non-medical: Not on file   Occupational History    Not on file   Tobacco Use    Smoking status: Former Smoker    Smokeless tobacco: Former User   Substance and Sexual Activity    Alcohol use: Not on file    Drug use: Not on file    Sexual activity: Not on file   Other Topics Concern    Not on file   Social History Narrative    Not on file     History reviewed. No pertinent family history.    Review of Systems   Constitutional: Positive for fatigue. Negative for chills, diaphoresis and fever.   HENT: Negative for congestion, hearing loss, mouth sores, postnasal drip and sore throat.    Eyes: Negative for pain and visual disturbance.   Respiratory: Negative for cough, chest tightness, shortness of breath and wheezing.    Cardiovascular: Positive for leg swelling. Negative for chest pain and palpitations.   Gastrointestinal: Negative for abdominal pain, anal bleeding, blood in stool, constipation, diarrhea, nausea and vomiting.   Genitourinary: Negative for dysuria and hematuria.   Musculoskeletal: Negative for back pain, neck pain and neck stiffness.   Skin: Negative for rash.   Neurological: Negative for dizziness and weakness.       Objective:     /75   Pulse 90   Temp 98.4 °F (36.9 °C) (Oral)   Ht 5' 8" (1.727 m)   Wt 101.6 kg (224 lb)   BMI 34.06 kg/m²     Physical Exam   Constitutional: He is oriented to person, place, and time. He appears well-developed and well-nourished.   HENT:   Head: Normocephalic and atraumatic.   Right Ear: External ear normal.   Left Ear: External ear normal.   Nose: Nose normal.   Mouth/Throat: Oropharynx is clear and moist. No oropharyngeal exudate.   Eyes: Conjunctivae and " EOM are normal. Pupils are equal, round, and reactive to light. Right eye exhibits no discharge. Left eye exhibits no discharge. No scleral icterus.   Neck: Normal range of motion. Neck supple. No JVD present. No thyromegaly present.   Cardiovascular: Normal rate, regular rhythm, normal heart sounds and intact distal pulses. Exam reveals no gallop and no friction rub.   No murmur heard.  Pulmonary/Chest: Effort normal and breath sounds normal. No respiratory distress. He has no wheezes. He has no rales. He exhibits no tenderness.   Abdominal: Soft. Bowel sounds are normal. He exhibits no distension and no mass. There is no tenderness. There is no rebound and no guarding.   Musculoskeletal: Normal range of motion. He exhibits no edema or tenderness.   Lymphadenopathy:     He has no cervical adenopathy.   Neurological: He is alert and oriented to person, place, and time. No cranial nerve deficit. Coordination normal.   Skin: Skin is warm and dry. He is not diaphoretic.   Psychiatric: He has a normal mood and affect.     Assessment:     1. Mixed hyperlipidemia    2. Essential hypertension    3. Edema, unspecified type    4. Fatigue, unspecified type    5. Decreased libido        Plan:     Problem List Items Addressed This Visit     Decreased libido    Relevant Orders    Testosterone, free    Edema    Relevant Orders    Brain natriuretic peptide    D dimer, quantitative    TSH    Essential hypertension    Fatigue    Mixed hyperlipidemia - Primary    Relevant Medications    atorvastatin (LIPITOR) 20 MG tablet      I will make decisions when I get the labs back.  At this point, the edema is not bad enough to change the amlodipine but this was discussed as a potential cause of this.  He is to eat a low salt diet.  Consider a sleep study.  Continue blood pressure treatment for now.  Use the Dash diet.  No Follow-up on file.

## 2018-08-20 PROBLEM — R68.82 DECREASED LIBIDO: Status: ACTIVE | Noted: 2018-08-20

## 2018-08-20 PROBLEM — R53.83 FATIGUE: Status: ACTIVE | Noted: 2018-08-20

## 2018-08-20 PROBLEM — R60.9 EDEMA: Status: ACTIVE | Noted: 2018-08-20

## 2018-08-21 NOTE — PROGRESS NOTES
THE THYROID IS NORMAL AND THE DDIMER IS ALSO NORMAL.  THIS INDICATES THERE IS NO SIGN OF THYROID PROBLEMS OR A BLOOD CLOT.

## 2018-08-23 LAB
BNP SERPL-MCNC: 5 PG/ML
D DIMER PPP FEU-MCNC: 0.43 MCG/ML FEU
TESTOST FREE SERPL-MCNC: 52.7 PG/ML (ref 35–155)
TESTOST SERPL-MCNC: 304 NG/DL (ref 250–1100)
TSH SERPL-ACNC: 3.32 MIU/L (ref 0.4–4.5)

## 2019-01-02 ENCOUNTER — TELEPHONE (OUTPATIENT)
Dept: FAMILY MEDICINE | Facility: CLINIC | Age: 49
End: 2019-01-02

## 2019-01-02 NOTE — TELEPHONE ENCOUNTER
----- Message from Naz Hernandez sent at 1/2/2019 12:10 PM CST -----  Pt at [phone]//states he is getting new insurance and is needing to know the date that  first prescribed his cholesterol med and his blood pressure med//please call to inform//susana/wilder

## 2019-01-02 NOTE — TELEPHONE ENCOUNTER
----- Message from Connie Lombardi sent at 1/2/2019  3:43 PM CST -----  Contact: PT  He's calling in regards to Dates on BP medication ,pls call pt back at [phone] (home) .

## 2019-02-06 ENCOUNTER — TELEPHONE (OUTPATIENT)
Dept: FAMILY MEDICINE | Facility: CLINIC | Age: 49
End: 2019-02-06

## 2019-02-06 NOTE — TELEPHONE ENCOUNTER
Pt is applying for disability insurance and has received a rejection for his neck and spine. They are referring back to an old injury from 9/2010 that will keep him from being able to purchase this insurance. Parminder at [facility] is asking if Dr Whipple can review the pt's chart and see if treatment for that injury was resolved or concluded that no further treatment was necessary. If so, he needs something in writing to give to the  so that patient can be insured.

## 2019-02-06 NOTE — TELEPHONE ENCOUNTER
----- Message from Tammy Campos sent at 2/6/2019  2:59 PM CST -----  Contact: Kev Buncher is requesting a call back from the nurse in regards to the pt disability form   [phone] or office [phone]

## 2019-02-07 ENCOUNTER — TELEPHONE (OUTPATIENT)
Dept: FAMILY MEDICINE | Facility: CLINIC | Age: 49
End: 2019-02-07

## 2019-02-07 NOTE — TELEPHONE ENCOUNTER
Pt calling to check on status of records regarding message sent by his  yesterday. Informed that message has been forwarded to Dr Whipple to review, and pt will be called once it has been resolved and ready for .

## 2019-02-07 NOTE — TELEPHONE ENCOUNTER
----- Message from Dulce Maria Godinez sent at 2/7/2019  9:25 AM CST -----  Contact: self   Requesting  A call back regarding needed information for insurance purposes. Please call back at [phone].      Thanks,  Dulce Maria Godinez

## 2019-02-11 ENCOUNTER — TELEPHONE (OUTPATIENT)
Dept: FAMILY MEDICINE | Facility: CLINIC | Age: 49
End: 2019-02-11

## 2019-02-11 NOTE — TELEPHONE ENCOUNTER
----- Message from Romana Rendon sent at 2/11/2019  1:54 PM CST -----  Contact: Parminder Toribio, [place] [phone]  Following up on the review of the patient injury and calling on the conclusion.  Needs a closure on the medical condition.  He has a deadline of Feb 20th.      Fax # [phone]

## 2019-02-11 NOTE — TELEPHONE ENCOUNTER
Spoke with Parminder from [facility]. He just wanted to check up on the status of the case. Hes going to be closed Feb 20th.

## 2019-02-14 ENCOUNTER — TELEPHONE (OUTPATIENT)
Dept: FAMILY MEDICINE | Facility: CLINIC | Age: 49
End: 2019-02-14

## 2019-02-14 NOTE — TELEPHONE ENCOUNTER
Pt is calling back to check on status of inquiry regarding disability insurance. Pt states that as a reminder he did PT with [facility] and was placed on Neurontin for 6 months. He had no further treatment after that and has been fine with no issues. States he actively works out and takes classes for martial arts at this time with no problems. The deadline for this request is 2/20/19. Please advise.

## 2019-02-14 NOTE — TELEPHONE ENCOUNTER
----- Message from Trupti Freitas sent at 2/14/2019  2:31 PM CST -----  Contact: self  Pt is calling to speak with nurse in regards to disability paperwork. Pt states it was one more piece of information he needed to give doctor's office. Pt would like to speak with nurse as soon as possible due to date paperwork is due and pt doesn't want to have to start over. Please call pt back at [phone].      Thanks,   Trupti Freitas

## 2019-02-15 ENCOUNTER — TELEPHONE (OUTPATIENT)
Dept: FAMILY MEDICINE | Facility: CLINIC | Age: 49
End: 2019-02-15

## 2019-02-15 NOTE — TELEPHONE ENCOUNTER
----- Message from Caryn Choe sent at 2/15/2019  9:15 AM CST -----  Contact: pt   Type:  Patient Returning Call    Who Called:pt   Who Left Message for Patient:Armaan nurse   Does the patient know what this is regarding?: yes info on insurance   Would the patient rather a call back or a response via [facility]? Call back   Best Call Back Number:[phone]  Additional Information:

## 2019-08-15 ENCOUNTER — PATIENT OUTREACH (OUTPATIENT)
Dept: ADMINISTRATIVE | Facility: HOSPITAL | Age: 49
End: 2019-08-15

## 2019-08-26 ENCOUNTER — PATIENT OUTREACH (OUTPATIENT)
Dept: ADMINISTRATIVE | Facility: HOSPITAL | Age: 49
End: 2019-08-26

## 2019-08-29 ENCOUNTER — PATIENT OUTREACH (OUTPATIENT)
Dept: ADMINISTRATIVE | Facility: HOSPITAL | Age: 49
End: 2019-08-29

## 2019-09-02 DIAGNOSIS — E78.2 MIXED HYPERLIPIDEMIA: ICD-10-CM

## 2019-09-02 RX ORDER — ATORVASTATIN CALCIUM 20 MG/1
TABLET, FILM COATED ORAL
Qty: 90 TABLET | Refills: 0 | Status: SHIPPED | OUTPATIENT
Start: 2019-09-02 | End: 2019-09-03 | Stop reason: SDUPTHER

## 2019-09-02 RX ORDER — AMLODIPINE BESYLATE 10 MG/1
TABLET ORAL
Qty: 90 TABLET | Refills: 0 | Status: SHIPPED | OUTPATIENT
Start: 2019-09-02 | End: 2019-09-03 | Stop reason: SDUPTHER

## 2019-09-02 NOTE — TELEPHONE ENCOUNTER
I refilled the requested medication x 1 month.    The patient is due for an visit in the office.  Call the patient on the phone and book the patient with Gabino Diaz NP for a visit.     PLEASE DOCUMENT THE FACT THAT YOU HAVE CONTACTED THE PATIENT IN THE CHART FOR FUTURE REFERENCE.    Health Maintenance Due   Topic Date Due    Colonoscopy  12/27/2018    Lipid Panel  06/15/2019

## 2019-09-03 ENCOUNTER — TELEPHONE (OUTPATIENT)
Dept: ENDOSCOPY | Facility: HOSPITAL | Age: 49
End: 2019-09-03

## 2019-09-03 DIAGNOSIS — E78.2 MIXED HYPERLIPIDEMIA: ICD-10-CM

## 2019-09-03 RX ORDER — AMLODIPINE BESYLATE 10 MG/1
10 TABLET ORAL DAILY
Qty: 90 TABLET | Refills: 0 | Status: SHIPPED | OUTPATIENT
Start: 2019-09-03 | End: 2019-09-04 | Stop reason: SDUPTHER

## 2019-09-03 RX ORDER — ATORVASTATIN CALCIUM 20 MG/1
20 TABLET, FILM COATED ORAL DAILY
Qty: 90 TABLET | Refills: 0 | Status: SHIPPED | OUTPATIENT
Start: 2019-09-03 | End: 2019-09-04 | Stop reason: SDUPTHER

## 2019-09-03 NOTE — TELEPHONE ENCOUNTER
I have signed for the following orders AND/OR meds.  Please call the patient and ask the patient to schedule the testing AND/OR inform about any medications that were sent.      No orders of the defined types were placed in this encounter.      Medications Ordered This Encounter   Medications    amLODIPine (NORVASC) 10 MG tablet     Sig: Take 1 tablet (10 mg total) by mouth once daily.     Dispense:  90 tablet     Refill:  0    atorvastatin (LIPITOR) 20 MG tablet     Sig: Take 1 tablet (20 mg total) by mouth once daily.     Dispense:  90 tablet     Refill:  0

## 2019-09-04 ENCOUNTER — TELEPHONE (OUTPATIENT)
Dept: FAMILY MEDICINE | Facility: CLINIC | Age: 49
End: 2019-09-04

## 2019-09-04 ENCOUNTER — OFFICE VISIT (OUTPATIENT)
Dept: FAMILY MEDICINE | Facility: CLINIC | Age: 49
End: 2019-09-04
Payer: COMMERCIAL

## 2019-09-04 VITALS
TEMPERATURE: 98 F | DIASTOLIC BLOOD PRESSURE: 85 MMHG | HEART RATE: 75 BPM | SYSTOLIC BLOOD PRESSURE: 128 MMHG | BODY MASS INDEX: 34.61 KG/M2 | WEIGHT: 228.38 LBS | HEIGHT: 68 IN

## 2019-09-04 DIAGNOSIS — E78.2 MIXED HYPERLIPIDEMIA: ICD-10-CM

## 2019-09-04 DIAGNOSIS — E29.1 HYPOGONADISM IN MALE: Primary | ICD-10-CM

## 2019-09-04 DIAGNOSIS — Z00.00 ANNUAL PHYSICAL EXAM: Primary | ICD-10-CM

## 2019-09-04 DIAGNOSIS — Z12.5 SCREENING FOR PROSTATE CANCER: ICD-10-CM

## 2019-09-04 DIAGNOSIS — I10 ESSENTIAL HYPERTENSION: ICD-10-CM

## 2019-09-04 DIAGNOSIS — Z12.11 COLON CANCER SCREENING: ICD-10-CM

## 2019-09-04 PROCEDURE — 99396 PREV VISIT EST AGE 40-64: CPT | Mod: S$GLB,,, | Performed by: NURSE PRACTITIONER

## 2019-09-04 PROCEDURE — 99999 PR PBB SHADOW E&M-EST. PATIENT-LVL III: CPT | Mod: PBBFAC,,, | Performed by: NURSE PRACTITIONER

## 2019-09-04 PROCEDURE — 3079F DIAST BP 80-89 MM HG: CPT | Mod: CPTII,S$GLB,, | Performed by: NURSE PRACTITIONER

## 2019-09-04 PROCEDURE — 99396 PR PREVENTIVE VISIT,EST,40-64: ICD-10-PCS | Mod: S$GLB,,, | Performed by: NURSE PRACTITIONER

## 2019-09-04 PROCEDURE — 3074F SYST BP LT 130 MM HG: CPT | Mod: CPTII,S$GLB,, | Performed by: NURSE PRACTITIONER

## 2019-09-04 PROCEDURE — 3074F PR MOST RECENT SYSTOLIC BLOOD PRESSURE < 130 MM HG: ICD-10-PCS | Mod: CPTII,S$GLB,, | Performed by: NURSE PRACTITIONER

## 2019-09-04 PROCEDURE — 99999 PR PBB SHADOW E&M-EST. PATIENT-LVL III: ICD-10-PCS | Mod: PBBFAC,,, | Performed by: NURSE PRACTITIONER

## 2019-09-04 PROCEDURE — 3079F PR MOST RECENT DIASTOLIC BLOOD PRESSURE 80-89 MM HG: ICD-10-PCS | Mod: CPTII,S$GLB,, | Performed by: NURSE PRACTITIONER

## 2019-09-04 RX ORDER — ATORVASTATIN CALCIUM 20 MG/1
20 TABLET, FILM COATED ORAL DAILY
Qty: 90 TABLET | Refills: 3 | Status: SHIPPED | OUTPATIENT
Start: 2019-09-04 | End: 2019-10-02

## 2019-09-04 RX ORDER — AMLODIPINE BESYLATE 10 MG/1
10 TABLET ORAL DAILY
Qty: 90 TABLET | Refills: 3 | Status: SHIPPED | OUTPATIENT
Start: 2019-09-04 | End: 2020-09-08

## 2019-09-04 NOTE — PROGRESS NOTES
Subjective:       Patient ID: Rupert Mir is a 48 y.o. male.    Chief Complaint: Annual Exam    He comes in for routine annual wellness.     Hypertension   This is a chronic problem. The current episode started more than 1 year ago. The problem is unchanged. The problem is controlled. Pertinent negatives include no chest pain, headaches, neck pain, palpitations or shortness of breath. Past treatments include calcium channel blockers. The current treatment provides significant improvement.   Hyperlipidemia   This is a chronic problem. The current episode started more than 1 year ago. The problem is controlled. Pertinent negatives include no chest pain, myalgias or shortness of breath. Current antihyperlipidemic treatment includes statins. The current treatment provides significant improvement of lipids. Risk factors for coronary artery disease include dyslipidemia, male sex, hypertension and obesity.   He has been doing karate and increased physical activity.   He is due for fasting labs and colonoscopy      Review of Systems   Constitutional: Positive for activity change. Negative for fatigue, fever and unexpected weight change.   HENT: Negative for ear pain, hearing loss, rhinorrhea, sore throat and trouble swallowing.    Eyes: Negative.  Negative for pain, discharge and visual disturbance.   Respiratory: Negative for cough, chest tightness, shortness of breath and wheezing.    Cardiovascular: Negative for chest pain and palpitations.   Gastrointestinal: Negative for abdominal pain, blood in stool, constipation, diarrhea, nausea and vomiting.   Endocrine: Negative for polydipsia and polyuria.   Genitourinary: Negative for difficulty urinating, dysuria, frequency, hematuria and urgency.   Musculoskeletal: Positive for arthralgias. Negative for joint swelling, myalgias and neck pain.   Skin: Negative for color change and rash.   Neurological: Negative for dizziness, weakness and headaches.    Psychiatric/Behavioral: Negative for confusion, dysphoric mood and sleep disturbance. The patient is not nervous/anxious.        Vitals:    09/04/19 0757   BP: 128/85   Pulse: 75   Temp: 98.1 °F (36.7 °C)       Objective:     Current Outpatient Medications   Medication Sig Dispense Refill    amLODIPine (NORVASC) 10 MG tablet Take 1 tablet (10 mg total) by mouth once daily. 90 tablet 3    atorvastatin (LIPITOR) 20 MG tablet Take 1 tablet (20 mg total) by mouth once daily. 90 tablet 3    multivitamin (THERAGRAN) per tablet Take 1 tablet by mouth.      fluticasone (FLONASE) 50 mcg/actuation nasal spray Use 2 puffs in each nostril q day. 16 g 0     No current facility-administered medications for this visit.        Physical Exam   Constitutional: He is oriented to person, place, and time. He appears well-developed. No distress.   HENT:   Head: Normocephalic and atraumatic.   Eyes: Pupils are equal, round, and reactive to light. EOM are normal.   Neck: Normal range of motion. Neck supple.   Cardiovascular: Normal rate and regular rhythm.   Pulmonary/Chest: Effort normal and breath sounds normal.   Musculoskeletal: Normal range of motion.   Neurological: He is alert and oriented to person, place, and time.   Skin: Skin is warm and dry. No rash noted.   Psychiatric: He has a normal mood and affect. Thought content normal.   Nursing note and vitals reviewed.      Assessment:       1. Annual physical exam    2. Essential hypertension    3. Mixed hyperlipidemia    4. Screening for prostate cancer    5. Colon cancer screening        Plan:   Annual physical exam  -     Cancel: Lipid panel; Future; Expected date: 09/04/2019  -     Cancel: Comprehensive metabolic panel; Future; Expected date: 09/04/2019  -     Cancel: CBC auto differential; Future; Expected date: 09/04/2019  -     Cancel: TSH; Future; Expected date: 09/04/2019  -     Cancel: Vitamin D; Future; Expected date: 09/04/2019  -     Cancel: PSA, Screening; Future;  Expected date: 09/04/2019  -     Cancel: Testosterone; Future; Expected date: 09/04/2019  -     PSA, Screening; Future; Expected date: 09/04/2019  -     Vitamin D; Future; Expected date: 09/04/2019  -     TSH; Future; Expected date: 09/04/2019  -     CBC auto differential; Future; Expected date: 09/04/2019  -     Comprehensive metabolic panel; Future; Expected date: 09/04/2019  -     Lipid panel; Future; Expected date: 09/04/2019  -     Testosterone; Future; Expected date: 09/04/2019    Essential hypertension  -     Cancel: Lipid panel; Future; Expected date: 09/04/2019  -     Cancel: Comprehensive metabolic panel; Future; Expected date: 09/04/2019  -     Cancel: CBC auto differential; Future; Expected date: 09/04/2019  -     Cancel: TSH; Future; Expected date: 09/04/2019  -     Cancel: Vitamin D; Future; Expected date: 09/04/2019  -     Cancel: PSA, Screening; Future; Expected date: 09/04/2019  -     Cancel: Testosterone; Future; Expected date: 09/04/2019  -     PSA, Screening; Future; Expected date: 09/04/2019  -     Vitamin D; Future; Expected date: 09/04/2019  -     TSH; Future; Expected date: 09/04/2019  -     CBC auto differential; Future; Expected date: 09/04/2019  -     Comprehensive metabolic panel; Future; Expected date: 09/04/2019  -     Lipid panel; Future; Expected date: 09/04/2019  -     Testosterone; Future; Expected date: 09/04/2019    Mixed hyperlipidemia  -     Cancel: Lipid panel; Future; Expected date: 09/04/2019  -     Cancel: Comprehensive metabolic panel; Future; Expected date: 09/04/2019  -     Cancel: CBC auto differential; Future; Expected date: 09/04/2019  -     Cancel: TSH; Future; Expected date: 09/04/2019  -     Cancel: Vitamin D; Future; Expected date: 09/04/2019  -     Cancel: PSA, Screening; Future; Expected date: 09/04/2019  -     Cancel: Testosterone; Future; Expected date: 09/04/2019  -     atorvastatin (LIPITOR) 20 MG tablet; Take 1 tablet (20 mg total) by mouth once daily.   Dispense: 90 tablet; Refill: 3  -     PSA, Screening; Future; Expected date: 09/04/2019  -     Vitamin D; Future; Expected date: 09/04/2019  -     TSH; Future; Expected date: 09/04/2019  -     CBC auto differential; Future; Expected date: 09/04/2019  -     Comprehensive metabolic panel; Future; Expected date: 09/04/2019  -     Lipid panel; Future; Expected date: 09/04/2019  -     Testosterone; Future; Expected date: 09/04/2019    Screening for prostate cancer  -     Cancel: PSA, Screening; Future; Expected date: 09/04/2019  -     PSA, Screening; Future; Expected date: 09/04/2019    Colon cancer screening  -     Case request GI: COLONOSCOPY    Other orders  -     amLODIPine (NORVASC) 10 MG tablet; Take 1 tablet (10 mg total) by mouth once daily.  Dispense: 90 tablet; Refill: 3        No follow-ups on file.    There are no Patient Instructions on file for this visit.

## 2019-09-05 ENCOUNTER — PATIENT MESSAGE (OUTPATIENT)
Dept: FAMILY MEDICINE | Facility: CLINIC | Age: 49
End: 2019-09-05

## 2019-09-05 ENCOUNTER — TELEPHONE (OUTPATIENT)
Dept: ENDOSCOPY | Facility: HOSPITAL | Age: 49
End: 2019-09-05

## 2019-09-05 LAB
25(OH)D3 SERPL-MCNC: 35 NG/ML (ref 30–100)
ALBUMIN SERPL-MCNC: 4.3 G/DL (ref 3.6–5.1)
ALBUMIN/GLOB SERPL: 1.5 (CALC) (ref 1–2.5)
ALP SERPL-CCNC: 82 U/L (ref 40–115)
ALT SERPL-CCNC: 24 U/L (ref 9–46)
AST SERPL-CCNC: 19 U/L (ref 10–35)
BASOPHILS # BLD AUTO: 50 CELLS/UL (ref 0–200)
BASOPHILS NFR BLD AUTO: 0.7 %
BILIRUB SERPL-MCNC: 0.8 MG/DL (ref 0.2–1.2)
BUN SERPL-MCNC: 18 MG/DL (ref 7–25)
BUN/CREAT SERPL: ABNORMAL (CALC) (ref 6–22)
CALCIUM SERPL-MCNC: 9.5 MG/DL (ref 8.6–10.3)
CHLORIDE SERPL-SCNC: 100 MMOL/L (ref 98–110)
CHOLEST SERPL-MCNC: 199 MG/DL
CHOLEST/HDLC SERPL: 4.1 (CALC)
CO2 SERPL-SCNC: 30 MMOL/L (ref 20–32)
CREAT SERPL-MCNC: 0.96 MG/DL (ref 0.7–1.33)
EOSINOPHIL # BLD AUTO: 360 CELLS/UL (ref 15–500)
EOSINOPHIL NFR BLD AUTO: 5 %
ERYTHROCYTE [DISTWIDTH] IN BLOOD BY AUTOMATED COUNT: 12.9 % (ref 11–15)
GFRSERPLBLD MDRD-ARVRAT: 92 ML/MIN/1.73M2
GLOBULIN SER CALC-MCNC: 2.9 G/DL (CALC) (ref 1.9–3.7)
GLUCOSE SERPL-MCNC: 101 MG/DL (ref 65–99)
HCT VFR BLD AUTO: 44.9 % (ref 38.5–50)
HDLC SERPL-MCNC: 49 MG/DL
HGB BLD-MCNC: 14.8 G/DL (ref 13.2–17.1)
LDLC SERPL CALC-MCNC: 122 MG/DL (CALC)
LYMPHOCYTES # BLD AUTO: 1886 CELLS/UL (ref 850–3900)
LYMPHOCYTES NFR BLD AUTO: 26.2 %
MCH RBC QN AUTO: 29.3 PG (ref 27–33)
MCHC RBC AUTO-ENTMCNC: 33 G/DL (ref 32–36)
MCV RBC AUTO: 88.9 FL (ref 80–100)
MONOCYTES # BLD AUTO: 583 CELLS/UL (ref 200–950)
MONOCYTES NFR BLD AUTO: 8.1 %
NEUTROPHILS # BLD AUTO: 4320 CELLS/UL (ref 1500–7800)
NEUTROPHILS NFR BLD AUTO: 60 %
NONHDLC SERPL-MCNC: 150 MG/DL (CALC)
PLATELET # BLD AUTO: 271 THOUSAND/UL (ref 140–400)
PMV BLD REES-ECKER: 9.6 FL (ref 7.5–12.5)
POTASSIUM SERPL-SCNC: 4.4 MMOL/L (ref 3.5–5.3)
PROT SERPL-MCNC: 7.2 G/DL (ref 6.1–8.1)
PSA SERPL-MCNC: 0.7 NG/ML
RBC # BLD AUTO: 5.05 MILLION/UL (ref 4.2–5.8)
SODIUM SERPL-SCNC: 137 MMOL/L (ref 135–146)
TESTOST SERPL-MCNC: 306 NG/DL (ref 250–827)
TRIGL SERPL-MCNC: 166 MG/DL
TSH SERPL-ACNC: 2.87 MIU/L (ref 0.4–4.5)
WBC # BLD AUTO: 7.2 THOUSAND/UL (ref 3.8–10.8)

## 2019-09-16 ENCOUNTER — PATIENT MESSAGE (OUTPATIENT)
Dept: FAMILY MEDICINE | Facility: CLINIC | Age: 49
End: 2019-09-16

## 2019-09-16 DIAGNOSIS — R07.2 PRECORDIAL PAIN: Primary | ICD-10-CM

## 2019-09-16 NOTE — TELEPHONE ENCOUNTER
The patient is had some exertional chest pain and has a history of hypertension and hyperlipidemia.  Because of this, I feel that he should have an exercise stress echo.  He needs to have a follow-up with me few days after the test is done.  Please arrange it.      I have signed for the following orders AND/OR meds.  Please call the patient and ask the patient to schedule the testing AND/OR inform about any medications that were sent.      Orders Placed This Encounter   Procedures    X-Ray Chest PA And Lateral     Standing Status:   Future     Standing Expiration Date:   9/16/2020    Exercise stress echo     Standing Status:   Future     Standing Expiration Date:   9/16/2020

## 2019-09-17 ENCOUNTER — TELEPHONE (OUTPATIENT)
Dept: ENDOSCOPY | Facility: HOSPITAL | Age: 49
End: 2019-09-17

## 2019-09-17 ENCOUNTER — PATIENT MESSAGE (OUTPATIENT)
Dept: ENDOSCOPY | Facility: HOSPITAL | Age: 49
End: 2019-09-17

## 2019-09-17 ENCOUNTER — TELEPHONE (OUTPATIENT)
Dept: CARDIOLOGY | Facility: CLINIC | Age: 49
End: 2019-09-17

## 2019-09-17 ENCOUNTER — HOSPITAL ENCOUNTER (OUTPATIENT)
Dept: RADIOLOGY | Facility: HOSPITAL | Age: 49
Discharge: HOME | End: 2019-09-17
Attending: FAMILY MEDICINE
Payer: COMMERCIAL

## 2019-09-17 DIAGNOSIS — R07.9 CHEST PAIN, UNSPECIFIED TYPE: Primary | ICD-10-CM

## 2019-09-17 DIAGNOSIS — R07.2 PRECORDIAL PAIN: ICD-10-CM

## 2019-09-17 PROCEDURE — 71046 X-RAY EXAM CHEST 2 VIEWS: CPT | Mod: 26,,, | Performed by: RADIOLOGY

## 2019-09-17 PROCEDURE — 71046 X-RAY EXAM CHEST 2 VIEWS: CPT | Mod: TC,PO

## 2019-09-17 PROCEDURE — 71046 XR CHEST PA AND LATERAL: ICD-10-PCS | Mod: 26,,, | Performed by: RADIOLOGY

## 2019-09-17 NOTE — TELEPHONE ENCOUNTER
Received phone call from patient and his wife Sneha requesting his test be done at [facility].She works for [facility] and wants it done there in case there is a problem.Message sent to Dr Whipple for his office to send request and schedule test at [facility].

## 2019-09-17 NOTE — TELEPHONE ENCOUNTER
Called [facility] scheduling Dept [phone] Gave  pt info Faxed order to [phone]  states they will call pt and get that scheduled

## 2019-09-17 NOTE — TELEPHONE ENCOUNTER
Attempted to reach patient and no answer at both called.Left message to contact office to schedule appointment/stress echo.Message to Dr Whipple.

## 2019-09-17 NOTE — TELEPHONE ENCOUNTER
The patient refuses for the [facility] cardiologist to do the test.   Please ask Toñoyuridia to sign a stress ECHO order to be arranged as per the patient's request due to chest pain. Send it to [facility] to get it scheduled and please ask them on the sheet that you send to fax reports to us at our fax # as [facility].

## 2019-09-17 NOTE — TELEPHONE ENCOUNTER
----- Message from Johana Hassan RN sent at 9/17/2019  1:06 PM CDT -----  Dr. 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    Yandy   CRS Office Visit     SUBJECTIVE:      Chief Complaint: rectal prolapse     History of Present Illness:  Patient is a 57 y.o. female presents with a long history of rectal prolapse and constipation, treated with miralax daily. She presents today to schedule surgical treatment of her rectal prolapse. She notes that she has had less issues since using miralax more regularly and refusing to strain during bowel movements. However, she notes that she had one episode of fecal incontinence at work in the past several weeks, which has never happened before. She otherwise denies accidents or episodes of fecal incontinence.  The patient recently completed a colonoscopy that was normal. She noted that she prolapsed during the preparation process but her prolapse reduced spontaneously. She reports pain when prolapse occurs and states it typically spontaneously reduces on own. She otherwise states that she is in her usual health. She denies f/c/n/v.     Prior abdominal surgeries: Epigastric hernia repair, partial hysterectomy      Review of patient's allergies indicates:  No Known Allergies          Past Medical History:   Diagnosis Date    Arthritis      Depression      Encounter for blood transfusion       as a child    Neck pain      Rectal prolapse      S/P epidural steroid injection 11/2016            Past Surgical History:   Procedure Laterality Date    BREAST SURGERY         augmentation    HERNIA REPAIR         umbilical    HYSTERECTOMY        TONSILLECTOMY          No family history on file.  Social History   Substance Use Topics    Smoking status: Former Smoker       Packs/day: 1.00       Types: Cigarettes       Start date: 5/12/2014    Smokeless tobacco: Never Used    Alcohol use Yes          Comment: rarely         Review of Systems:  Constitutional: no fever or chills  Eyes: no visual changes  ENT: no nasal congestion or sore throat  Respiratory: no cough or shortness of breath  Cardiovascular: no chest  pain or palpitations  Gastrointestinal: no nausea or vomiting, tolerating diet  Genitourinary: no hematuria or dysuria     OBJECTIVE:      Vital Signs (Most Recent)  BP: (!) 159/93 (05/22/17 0955)     Physical Exam:  General: White female in NAD sitting in chair in clinic  Neuro: aaox4 maex4 perrl  Respiratory: resps even unlabored  Cardiac: cap refill <2 sec  Abdomen: Normal, benign.  Extremities: Warm dry and intact  Anorectal: Full thickness rectal prolapse appreciated with straining on commode      ASSESSMENT/PLAN:         Rectal prolapse     Given her her long history of constipation and her persistent rectal prolapse the patient was offered resection rectopexy. After discussion of the p/r/b/i/a the patient agreed to proceed.    Preop teaching done to include:          Details of planned surgery reviewed:  · Review of procedure  · Expected LOS.    · Preop process- application of WENDIE hose and SCD's, IV and IVF  · Different methods of post op pain control (IV and oral)    · Use of and importance of IS and other prophylaxis  · Expected early ambulation  · Slow advancement of diet      Goals that need to be met before discharge:  · No fever  · Functional status at baseline    · Tolerating low fiber diet    · Positive bowel function  · Adequate pain control with oral meds  · Vital signs and labs stable      Home instructions begun during this preop visit:    · May shower (no tub bath),    · No heavy lifting, nothing greater then 5 pounds,  · Small frequent meals, low residue,    · Ostomy care if needed,    · Call for fever above 101, nausea/vomiting, no bm and any increase in pain       All questions answered to pt and family satisfaction.    Admission paperwork was accomplished including operative consent and consent for blood and blood product administration.      The procedure was explained to the patient including indications, risks and alternatives. The patient verbalized understanding and has given informed  consent.     Gerardo Gilbert MD  Colorectal Surgery Fellow  [phone]      I have interviewed and examined the patient, reviewed the notes and assessments, and/or personally supervised the procedure(s) performed by Dr. Gilbert, and I concur with her/his documentation of Marilee Townsend.  See below addendum for my evaluation and additional findings.    Recent colonoscopy normal.  She is ready to proceed with surgery.  Discussed perineal repair vs rectoepxy - she opts for the latter  Given her issues with chronic constipation and potential for worsening post-op, I recommended concomitant sigmoid resction, which she agrees to.     I have discussed the procedure at length with Marilee Townsend.  We discussed the rationale, risks, benefits, and alternatives in depth.  We discussed the expected outcomes and potential complications including but not limited to bleeding, infection, anastomotic leak, recurrence, prolonged pain, need for further procedures, altered continence and incisional hernia.  She verbalized her understanding of the procedure and wishes to proceed.  Written consent was obtained.    Scheduled for 7/26/17.    Bill Lopez MD, FACS, FASCRS

## 2019-09-18 ENCOUNTER — PATIENT MESSAGE (OUTPATIENT)
Dept: FAMILY MEDICINE | Facility: CLINIC | Age: 49
End: 2019-09-18

## 2019-09-18 ENCOUNTER — TELEPHONE (OUTPATIENT)
Dept: CARDIOLOGY | Facility: CLINIC | Age: 49
End: 2019-09-18

## 2019-09-18 DIAGNOSIS — R07.9 CHEST PAIN, UNSPECIFIED TYPE: Primary | ICD-10-CM

## 2019-09-18 NOTE — TELEPHONE ENCOUNTER
The patient's wife called and stated [facility] cannot schedule a stress echo until 10/2/19. Their insurance with [employer] stated the coverage at the [facility] facility would not cover testing and another reason to have at [facility]. .Message to Dr Whipple if he requests or feels stress echo is needed stat they will fit patient in sooner at [facility]..

## 2019-09-19 ENCOUNTER — TELEPHONE (OUTPATIENT)
Dept: ENDOSCOPY | Facility: HOSPITAL | Age: 49
End: 2019-09-19

## 2019-09-19 ENCOUNTER — TELEPHONE (OUTPATIENT)
Dept: FAMILY MEDICINE | Facility: CLINIC | Age: 49
End: 2019-09-19

## 2019-09-19 NOTE — TELEPHONE ENCOUNTER
----- Message from Johana Hassan RN sent at 9/18/2019  1:50 PM CDT -----  Dr Whipple I got a call from  stating the patient will not be able to have his stress echo at [facility] until 10/2/19.She is concerned and worried since he has been advised to take it easy until testing done.She stated her insurance would not cover his test at a [facility] facility so reason it is scheduled there.The  stated that if you request the test as an asap need they will fit him in sooner.The patient and wife are asking if you can do that for them so testing can be moved earlier than scheduled.She asked I send you this message for her.    Thank you,  Carrie

## 2019-09-19 NOTE — TELEPHONE ENCOUNTER
----- Message from Patricia Sheikh sent at 9/19/2019 10:08 AM CDT -----  Contact: Mandy/Meenu  States she needs to speak to Ronald regarding an authorization. One of the test require an auth orization and she wanted to make you aware of it. Please call Madny at [phone]. Thank you

## 2019-09-19 NOTE — TELEPHONE ENCOUNTER
This was addressed this morning. A new order was faxed over signed by Gabino with Results faxed back to you at our fax ordered stat. Which all is on order. I spoke with April and Mandy from [place] already.

## 2019-09-19 NOTE — TELEPHONE ENCOUNTER
----- Message from Korina Metzger sent at 9/19/2019  9:14 AM CDT -----  Contact: [facility] dept  Ms Mandy needs to speak to nurse regarding patient, please call her back at [phone]. Thank you

## 2019-09-19 NOTE — TELEPHONE ENCOUNTER
----- Message from Sachi Macias sent at 9/19/2019  8:26 AM CDT -----  ..Type:  Patient Returning Call    Who Called:April ( Xavier Juarez )   Who Left Message for Patient:  Does the patient know what this is regarding?:  Would the patient rather a call back or a response via [facility]? Call back   Best Call Back Number:[phone] or [phone] ( fax send to April )  Additional Information: April ( Xavier Juarez ) is requesting a call from nurse to discuss the order that was sent need to be electronically sign or Ordering provider sign.

## 2019-09-19 NOTE — TELEPHONE ENCOUNTER
Called and left  for April letting her know that we faxed over the order again. And she can call us back if she needed to. Faxed to [phone]

## 2019-09-20 ENCOUNTER — PATIENT MESSAGE (OUTPATIENT)
Dept: FAMILY MEDICINE | Facility: CLINIC | Age: 49
End: 2019-09-20

## 2019-10-02 DIAGNOSIS — E78.2 MIXED HYPERLIPIDEMIA: Primary | ICD-10-CM

## 2019-10-02 RX ORDER — ATORVASTATIN CALCIUM 40 MG/1
40 TABLET, FILM COATED ORAL DAILY
Qty: 90 TABLET | Refills: 3 | Status: SHIPPED | OUTPATIENT
Start: 2019-10-02 | End: 2020-10-01

## 2019-11-07 DIAGNOSIS — Z12.11 COLON CANCER SCREENING: ICD-10-CM

## 2020-03-05 ENCOUNTER — TELEPHONE (OUTPATIENT)
Dept: FAMILY MEDICINE | Facility: CLINIC | Age: 50
End: 2020-03-05

## 2020-03-05 ENCOUNTER — OFFICE VISIT (OUTPATIENT)
Dept: FAMILY MEDICINE | Facility: CLINIC | Age: 50
End: 2020-03-05
Payer: COMMERCIAL

## 2020-03-05 VITALS
DIASTOLIC BLOOD PRESSURE: 83 MMHG | HEART RATE: 81 BPM | TEMPERATURE: 99 F | WEIGHT: 228.81 LBS | HEIGHT: 68 IN | BODY MASS INDEX: 34.68 KG/M2 | SYSTOLIC BLOOD PRESSURE: 122 MMHG

## 2020-03-05 DIAGNOSIS — J11.1 FLU: Primary | ICD-10-CM

## 2020-03-05 LAB
INFLUENZA A, MOLECULAR: NEGATIVE
INFLUENZA B, MOLECULAR: NEGATIVE
SPECIMEN SOURCE: NORMAL

## 2020-03-05 PROCEDURE — 3079F DIAST BP 80-89 MM HG: CPT | Mod: CPTII,S$GLB,, | Performed by: FAMILY MEDICINE

## 2020-03-05 PROCEDURE — 99213 OFFICE O/P EST LOW 20 MIN: CPT | Mod: S$GLB,,, | Performed by: FAMILY MEDICINE

## 2020-03-05 PROCEDURE — 3008F BODY MASS INDEX DOCD: CPT | Mod: CPTII,S$GLB,, | Performed by: FAMILY MEDICINE

## 2020-03-05 PROCEDURE — 99213 PR OFFICE/OUTPT VISIT, EST, LEVL III, 20-29 MIN: ICD-10-PCS | Mod: S$GLB,,, | Performed by: FAMILY MEDICINE

## 2020-03-05 PROCEDURE — 87502 INFLUENZA DNA AMP PROBE: CPT | Mod: PO

## 2020-03-05 PROCEDURE — 3074F SYST BP LT 130 MM HG: CPT | Mod: CPTII,S$GLB,, | Performed by: FAMILY MEDICINE

## 2020-03-05 PROCEDURE — 3008F PR BODY MASS INDEX (BMI) DOCUMENTED: ICD-10-PCS | Mod: CPTII,S$GLB,, | Performed by: FAMILY MEDICINE

## 2020-03-05 PROCEDURE — 99999 PR PBB SHADOW E&M-EST. PATIENT-LVL III: CPT | Mod: PBBFAC,,, | Performed by: FAMILY MEDICINE

## 2020-03-05 PROCEDURE — 99999 PR PBB SHADOW E&M-EST. PATIENT-LVL III: ICD-10-PCS | Mod: PBBFAC,,, | Performed by: FAMILY MEDICINE

## 2020-03-05 PROCEDURE — 3074F PR MOST RECENT SYSTOLIC BLOOD PRESSURE < 130 MM HG: ICD-10-PCS | Mod: CPTII,S$GLB,, | Performed by: FAMILY MEDICINE

## 2020-03-05 PROCEDURE — 3079F PR MOST RECENT DIASTOLIC BLOOD PRESSURE 80-89 MM HG: ICD-10-PCS | Mod: CPTII,S$GLB,, | Performed by: FAMILY MEDICINE

## 2020-03-05 RX ORDER — METHYLPREDNISOLONE 4 MG/1
TABLET ORAL
Qty: 1 PACKAGE | Refills: 0 | Status: SHIPPED | OUTPATIENT
Start: 2020-03-05 | End: 2020-03-26

## 2020-03-05 RX ORDER — CODEINE PHOSPHATE AND GUAIFENESIN 10; 100 MG/5ML; MG/5ML
5 SOLUTION ORAL EVERY 6 HOURS PRN
Qty: 240 ML | Refills: 0 | Status: SHIPPED | OUTPATIENT
Start: 2020-03-05 | End: 2020-03-15

## 2020-03-05 NOTE — PROGRESS NOTES
Rupert Mir presents with moderate upper respiratory congestion,rhinnorhea,moderate cough past 2-3 days. OTC help slightly. Denies nausea,vomiting,diarrhea or significant fever.    Past Medical History:   Diagnosis Date    Broken nose 12/2017    Pneumonia 12/2017    Primary hypercholesterolemia     Syncope, vasovagal 12/2017     Past Surgical History:   Procedure Laterality Date    bulging disks      TONSILLECTOMY      When i was a child approx 4yrs old     Review of patient's allergies indicates:   Allergen Reactions    No known drug allergies      Current Outpatient Medications on File Prior to Visit   Medication Sig Dispense Refill    amLODIPine (NORVASC) 10 MG tablet Take 1 tablet (10 mg total) by mouth once daily. 90 tablet 3    atorvastatin (LIPITOR) 40 MG tablet Take 1 tablet (40 mg total) by mouth once daily. 90 tablet 3    fluticasone (FLONASE) 50 mcg/actuation nasal spray Use 2 puffs in each nostril q day. 16 g 0    multivitamin (THERAGRAN) per tablet Take 1 tablet by mouth.       No current facility-administered medications on file prior to visit.      Social History     Socioeconomic History    Marital status:      Spouse name: Not on file    Number of children: Not on file    Years of education: Not on file    Highest education level: Not on file   Occupational History    Not on file   Social Needs    Financial resource strain: Not on file    Food insecurity:     Worry: Not on file     Inability: Not on file    Transportation needs:     Medical: Not on file     Non-medical: Not on file   Tobacco Use    Smoking status: Former Smoker    Smokeless tobacco: Former User   Substance and Sexual Activity    Alcohol use: Yes     Alcohol/week: 5.0 standard drinks     Types: 2 Glasses of wine, 2 Cans of beer, 1 Standard drinks or equivalent per week     Comment: Maybe 2 drinks during the week.couple more on weekend    Drug use: Never    Sexual activity: Yes     Partners:  Female     Birth control/protection: None   Lifestyle    Physical activity:     Days per week: Not on file     Minutes per session: Not on file    Stress: Not on file   Relationships    Social connections:     Talks on phone: Not on file     Gets together: Not on file     Attends Scientologist service: Not on file     Active member of club or organization: Not on file     Attends meetings of clubs or organizations: Not on file     Relationship status: Not on file   Other Topics Concern    Not on file   Social History Narrative    Not on file     Family History   Problem Relation Age of Onset    Arthritis Mother     Arthritis Maternal Grandmother     Diabetes Paternal Uncle          ROS:  SKIN: No rashes, itching or changes in color or texture of skin.  EYES: Visual acuity fine. No photophobia, ocular pain or diplopia.EARS: Denies ear pain, discharge or vertigo.NOSE: No loss of smell, no epistaxis some postnasal drip.MOUTH & THROAT: No hoarseness or change in voice. No excessive gum bleeding.CHEST: Denies MARINO, cyanosis, wheezing  CARDIOVASCULAR: Denies chest pain, PND, orthopnea or reduced exercise tolerance.  ABDOMEN:  No weight loss.No abdominal pain, no hematemesis or blood in stool.  URINARY: No flank pain, dysuria or hematuria.  PERIPHERAL VASCULAR: No claudication or cyanosis.  MUSCULOSKELETAL: Negative   NEUROLOGIC: No history of seizures, paralysis, alteration of gait or coordination.    PE: Vital signs as noted  Heent:Normocephalic with no recent cranial trauma,PERRLA,EOMI,conjunctiva clear,fundi reveal no hemmorhage exudate or papilledema.Otic canals clear, tympanic membranes slightly dull bilaterally.Nasal mucosa slightly red and edematous.Posterior pharynx slightly red but without exudate.  Neck:Supple with minimal anterior cervical adenopathy.  Chest:Clear bilateral breath sounds with mild scattered ronchi  Heart:Regular rhthym without murmer  Abdomen:Soft, non tender,no masses, no  hepatosplenomegalyExtremeties and Neurologic:Grossly within normal limits  Impression: Upper Respiratory Infection. 465.9  Plan: Flu screen-neg  Medrol dspk

## 2020-03-05 NOTE — TELEPHONE ENCOUNTER
----- Message from Dexter Weiner sent at 3/5/2020 11:04 AM CST -----  Contact: pt   -Type:  Same Day Appointment Request    Caller is requesting a same day appointment.  Caller declined first available appointment listed below.    Name of Caller: pt   When is the first available appointment?03/06 with ms Canas  Symptoms: cough/ nausea   Best Call Back Number: [phone]  Additional Information:

## 2020-03-19 ENCOUNTER — OFFICE VISIT (OUTPATIENT)
Dept: FAMILY MEDICINE | Facility: CLINIC | Age: 50
End: 2020-03-19
Payer: COMMERCIAL

## 2020-03-19 DIAGNOSIS — J40 BRONCHITIS: Primary | ICD-10-CM

## 2020-03-19 PROCEDURE — 99213 OFFICE O/P EST LOW 20 MIN: CPT | Mod: 95,,, | Performed by: NURSE PRACTITIONER

## 2020-03-19 PROCEDURE — 99213 PR OFFICE/OUTPT VISIT, EST, LEVL III, 20-29 MIN: ICD-10-PCS | Mod: 95,,, | Performed by: NURSE PRACTITIONER

## 2020-03-19 RX ORDER — ALBUTEROL SULFATE 90 UG/1
2 AEROSOL, METERED RESPIRATORY (INHALATION) EVERY 6 HOURS PRN
Qty: 8 G | Refills: 0 | Status: SHIPPED | OUTPATIENT
Start: 2020-03-19 | End: 2021-11-04

## 2020-03-19 RX ORDER — AZITHROMYCIN 250 MG/1
TABLET, FILM COATED ORAL
Qty: 6 TABLET | Refills: 0 | Status: SHIPPED | OUTPATIENT
Start: 2020-03-19 | End: 2020-03-24

## 2020-03-19 RX ORDER — MONTELUKAST SODIUM 10 MG/1
10 TABLET ORAL NIGHTLY
Qty: 30 TABLET | Refills: 0 | Status: SHIPPED | OUTPATIENT
Start: 2020-03-19 | End: 2020-04-18

## 2020-03-19 NOTE — PROGRESS NOTES
Subjective:       Patient ID: Rupert Mir is a 49 y.o. male.    Chief Complaint: No chief complaint on file.    The patient location is: [place], [place].   The chief complaint leading to consultation is: cough  Visit type: Virtual visit with synchronous audio and video  Total time spent with patient: 15 minutes  Each patient to whom he or she provides medical services by telemedicine is:  (1) informed of the relationship between the physician and patient and the respective role of any other health care provider with respect to management of the patient; and (2) notified that he or she may decline to receive medical services by telemedicine and may withdraw from such care at any time.    Notes:   Patient was on a cruise during [place], came home with a cough and flu like symptoms, no fever. Seen 3/5/2020, flu negative, treated with medrol dose pack. He has continued to have a mostly dry cough, sometimes productive. Not responding to prn use albuterol, not currently taking any other medications. No fever.    Past Medical History:  12/2017: Broken nose  12/2017: Pneumonia  No date: Primary hypercholesterolemia  12/2017: Syncope, vasovagal    Past Surgical History:  No date: bulging disks  No date: TONSILLECTOMY      Comment:  When i was a child approx 4yrs old    Review of patient's family history indicates:  Problem: Arthritis      Relation: Mother          Age of Onset: (Not Specified)  Problem: Arthritis      Relation: Maternal Grandmother          Age of Onset: (Not Specified)  Problem: Diabetes      Relation: Paternal Uncle          Age of Onset: (Not Specified)      Social History    Socioeconomic History      Marital status:       Spouse name: Not on file      Number of children: Not on file      Years of education: Not on file      Highest education level: Not on file    Occupational History      Not on file    Social Needs      Financial resource strain: Not on file      Food insecurity:         Worry: Not on file        Inability: Not on file      Transportation needs:        Medical: Not on file        Non-medical: Not on file    Tobacco Use      Smoking status: Former Smoker      Smokeless tobacco: Former User    Substance and Sexual Activity      Alcohol use: Yes        Alcohol/week: 5.0 standard drinks        Types: 2 Glasses of wine, 2 Cans of beer, 1 Standard drinks or equivalent per week        Comment: Maybe 2 drinks during the week.couple more on weekend      Drug use: Never      Sexual activity: Yes        Partners: Female        Birth control/protection: None    Lifestyle      Physical activity:        Days per week: Not on file        Minutes per session: Not on file      Stress: Not on file    Relationships      Social connections:        Talks on phone: Not on file        Gets together: Not on file        Attends Religion service: Not on file        Active member of club or organization: Not on file        Attends meetings of clubs or organizations: Not on file        Relationship status: Not on file    Other Topics      Concerns:        Not on file    Social History Narrative      Not on file      Current Outpatient Medications:  albuterol (VENTOLIN HFA) 90 mcg/actuation inhaler, Inhale 2 puffs into the lungs every 6 (six) hours as needed. Rescue, Disp: 8 g, Rfl: 0  amLODIPine (NORVASC) 10 MG tablet, Take 1 tablet (10 mg total) by mouth once daily., Disp: 90 tablet, Rfl: 3  atorvastatin (LIPITOR) 40 MG tablet, Take 1 tablet (40 mg total) by mouth once daily., Disp: 90 tablet, Rfl: 3  azithromycin (Z-HILARIO) 250 MG tablet, Take 2 tablets by mouth on day 1; Take 1 tablet by mouth on days 2-5, Disp: 6 tablet, Rfl: 0  fluticasone (FLONASE) 50 mcg/actuation nasal spray, Use 2 puffs in each nostril q day., Disp: 16 g, Rfl: 0  methylPREDNISolone (MEDROL DOSEPACK) 4 mg tablet, use as directed, Disp: 1 Package, Rfl: 0  montelukast (SINGULAIR) 10 mg tablet, Take 1 tablet (10 mg total) by mouth every  evening., Disp: 30 tablet, Rfl: 0  multivitamin (THERAGRAN) per tablet, Take 1 tablet by mouth., Disp: , Rfl:     No current facility-administered medications for this visit.       Review of patient's allergies indicates:   -- No known drug allergies     Review of Systems   Constitutional: Negative for chills, fatigue and fever.   HENT: Positive for rhinorrhea. Negative for postnasal drip, sore throat and trouble swallowing.    Respiratory: Positive for cough. Negative for shortness of breath and wheezing.    Cardiovascular: Negative.    Gastrointestinal: Negative.    Neurological: Negative.        Objective:      Physical Exam   Constitutional: No distress.   HENT:   Head: Normocephalic and atraumatic.   Pulmonary/Chest: No respiratory distress. He has no wheezes.   Skin: He is not diaphoretic.       Assessment:       1. Bronchitis        Plan:       1. Bronchitis  Follow up if not resolving, immediately for new or worsening symptoms.   - azithromycin (Z-HILARIO) 250 MG tablet; Take 2 tablets by mouth on day 1; Take 1 tablet by mouth on days 2-5  Dispense: 6 tablet; Refill: 0  - montelukast (SINGULAIR) 10 mg tablet; Take 1 tablet (10 mg total) by mouth every evening.  Dispense: 30 tablet; Refill: 0  - albuterol (VENTOLIN HFA) 90 mcg/actuation inhaler; Inhale 2 puffs into the lungs every 6 (six) hours as needed. Rescue  Dispense: 8 g; Refill: 0

## 2020-08-19 ENCOUNTER — PATIENT OUTREACH (OUTPATIENT)
Dept: ADMINISTRATIVE | Facility: HOSPITAL | Age: 50
End: 2020-08-19

## 2020-08-19 NOTE — PROGRESS NOTES
[facility] REPORT: Attempting to contact pt to schedule over due HM & annual F/U. Unable to reach patient at this time. Left voicemail.

## 2020-09-05 DIAGNOSIS — E78.2 MIXED HYPERLIPIDEMIA: ICD-10-CM

## 2020-09-08 RX ORDER — AMLODIPINE BESYLATE 10 MG/1
TABLET ORAL
Qty: 90 TABLET | Refills: 0 | Status: SHIPPED | OUTPATIENT
Start: 2020-09-08 | End: 2020-12-08 | Stop reason: SDUPTHER

## 2020-09-08 RX ORDER — ATORVASTATIN CALCIUM 20 MG/1
TABLET, FILM COATED ORAL
Qty: 90 TABLET | Refills: 0 | Status: SHIPPED | OUTPATIENT
Start: 2020-09-08 | End: 2020-12-08 | Stop reason: SDUPTHER

## 2020-10-05 ENCOUNTER — PATIENT MESSAGE (OUTPATIENT)
Dept: ADMINISTRATIVE | Facility: HOSPITAL | Age: 50
End: 2020-10-05

## 2020-12-08 DIAGNOSIS — E78.2 MIXED HYPERLIPIDEMIA: ICD-10-CM

## 2020-12-08 RX ORDER — AMLODIPINE BESYLATE 10 MG/1
10 TABLET ORAL DAILY
Qty: 90 TABLET | Refills: 0 | Status: SHIPPED | OUTPATIENT
Start: 2020-12-08 | End: 2021-03-10

## 2020-12-08 RX ORDER — ATORVASTATIN CALCIUM 20 MG/1
20 TABLET, FILM COATED ORAL DAILY
Qty: 90 TABLET | Refills: 0 | Status: SHIPPED | OUTPATIENT
Start: 2020-12-08 | End: 2021-03-18 | Stop reason: SDUPTHER

## 2021-01-29 ENCOUNTER — PATIENT MESSAGE (OUTPATIENT)
Dept: ADMINISTRATIVE | Facility: HOSPITAL | Age: 51
End: 2021-01-29

## 2021-03-10 ENCOUNTER — PATIENT MESSAGE (OUTPATIENT)
Dept: FAMILY MEDICINE | Facility: CLINIC | Age: 51
End: 2021-03-10

## 2021-03-10 RX ORDER — AMLODIPINE BESYLATE 10 MG/1
TABLET ORAL
Qty: 90 TABLET | Refills: 0 | Status: SHIPPED | OUTPATIENT
Start: 2021-03-10 | End: 2021-04-23 | Stop reason: SDUPTHER

## 2021-03-16 ENCOUNTER — PATIENT MESSAGE (OUTPATIENT)
Dept: ADMINISTRATIVE | Facility: HOSPITAL | Age: 51
End: 2021-03-16

## 2021-03-16 ENCOUNTER — PATIENT MESSAGE (OUTPATIENT)
Dept: FAMILY MEDICINE | Facility: CLINIC | Age: 51
End: 2021-03-16

## 2021-03-18 DIAGNOSIS — E78.2 MIXED HYPERLIPIDEMIA: ICD-10-CM

## 2021-03-18 RX ORDER — ATORVASTATIN CALCIUM 20 MG/1
20 TABLET, FILM COATED ORAL DAILY
Qty: 90 TABLET | Refills: 0 | Status: SHIPPED | OUTPATIENT
Start: 2021-03-18 | End: 2021-04-23 | Stop reason: SDUPTHER

## 2021-04-23 ENCOUNTER — OFFICE VISIT (OUTPATIENT)
Dept: FAMILY MEDICINE | Facility: CLINIC | Age: 51
End: 2021-04-23
Payer: COMMERCIAL

## 2021-04-23 VITALS
WEIGHT: 213 LBS | HEART RATE: 64 BPM | HEIGHT: 68 IN | DIASTOLIC BLOOD PRESSURE: 86 MMHG | BODY MASS INDEX: 32.28 KG/M2 | SYSTOLIC BLOOD PRESSURE: 144 MMHG | TEMPERATURE: 98 F

## 2021-04-23 DIAGNOSIS — E78.2 MIXED HYPERLIPIDEMIA: ICD-10-CM

## 2021-04-23 DIAGNOSIS — Z12.5 ENCOUNTER FOR PROSTATE CANCER SCREENING: ICD-10-CM

## 2021-04-23 DIAGNOSIS — Z11.4 ENCOUNTER FOR SCREENING FOR HIV: ICD-10-CM

## 2021-04-23 DIAGNOSIS — Z23 IMMUNIZATION DUE: ICD-10-CM

## 2021-04-23 DIAGNOSIS — I10 ESSENTIAL HYPERTENSION: ICD-10-CM

## 2021-04-23 DIAGNOSIS — Z00.00 ANNUAL PHYSICAL EXAM: Primary | ICD-10-CM

## 2021-04-23 DIAGNOSIS — Z11.59 NEED FOR HEPATITIS C SCREENING TEST: ICD-10-CM

## 2021-04-23 DIAGNOSIS — E66.9 OBESITY (BMI 30-39.9): ICD-10-CM

## 2021-04-23 PROBLEM — J20.8 ACUTE BRONCHITIS DUE TO OTHER SPECIFIED ORGANISMS: Status: RESOLVED | Noted: 2017-12-13 | Resolved: 2021-04-23

## 2021-04-23 PROCEDURE — 99396 PR PREVENTIVE VISIT,EST,40-64: ICD-10-PCS | Mod: S$GLB,,, | Performed by: FAMILY MEDICINE

## 2021-04-23 PROCEDURE — 1126F AMNT PAIN NOTED NONE PRSNT: CPT | Mod: S$GLB,,, | Performed by: FAMILY MEDICINE

## 2021-04-23 PROCEDURE — 3008F BODY MASS INDEX DOCD: CPT | Mod: CPTII,S$GLB,, | Performed by: FAMILY MEDICINE

## 2021-04-23 PROCEDURE — 99999 PR PBB SHADOW E&M-EST. PATIENT-LVL III: CPT | Mod: PBBFAC,,, | Performed by: FAMILY MEDICINE

## 2021-04-23 PROCEDURE — 99396 PREV VISIT EST AGE 40-64: CPT | Mod: S$GLB,,, | Performed by: FAMILY MEDICINE

## 2021-04-23 PROCEDURE — 1126F PR PAIN SEVERITY QUANTIFIED, NO PAIN PRESENT: ICD-10-PCS | Mod: S$GLB,,, | Performed by: FAMILY MEDICINE

## 2021-04-23 PROCEDURE — 99999 PR PBB SHADOW E&M-EST. PATIENT-LVL III: ICD-10-PCS | Mod: PBBFAC,,, | Performed by: FAMILY MEDICINE

## 2021-04-23 PROCEDURE — 3008F PR BODY MASS INDEX (BMI) DOCUMENTED: ICD-10-PCS | Mod: CPTII,S$GLB,, | Performed by: FAMILY MEDICINE

## 2021-04-23 RX ORDER — ATORVASTATIN CALCIUM 20 MG/1
20 TABLET, FILM COATED ORAL DAILY
Qty: 90 TABLET | Refills: 3 | Status: SHIPPED | OUTPATIENT
Start: 2021-04-23 | End: 2021-06-17

## 2021-04-23 RX ORDER — AMLODIPINE BESYLATE 10 MG/1
10 TABLET ORAL DAILY
Qty: 90 TABLET | Refills: 3 | Status: SHIPPED | OUTPATIENT
Start: 2021-04-23 | End: 2021-06-29

## 2021-04-29 ENCOUNTER — CLINICAL SUPPORT (OUTPATIENT)
Dept: FAMILY MEDICINE | Facility: CLINIC | Age: 51
End: 2021-04-29
Payer: COMMERCIAL

## 2021-04-29 DIAGNOSIS — Z23 IMMUNIZATION DUE: ICD-10-CM

## 2021-04-29 PROCEDURE — 90750 HZV VACC RECOMBINANT IM: CPT | Mod: S$GLB,,, | Performed by: FAMILY MEDICINE

## 2021-04-29 PROCEDURE — 99999 PR PBB SHADOW E&M-EST. PATIENT-LVL I: ICD-10-PCS | Mod: PBBFAC,,,

## 2021-04-29 PROCEDURE — 90471 IMMUNIZATION ADMIN: CPT | Mod: S$GLB,,, | Performed by: FAMILY MEDICINE

## 2021-04-29 PROCEDURE — 99999 PR PBB SHADOW E&M-EST. PATIENT-LVL I: CPT | Mod: PBBFAC,,,

## 2021-04-29 PROCEDURE — 90471 ZOSTER RECOMBINANT VACCINE: ICD-10-PCS | Mod: S$GLB,,, | Performed by: FAMILY MEDICINE

## 2021-04-29 PROCEDURE — 90750 ZOSTER RECOMBINANT VACCINE: ICD-10-PCS | Mod: S$GLB,,, | Performed by: FAMILY MEDICINE

## 2021-06-17 DIAGNOSIS — E78.2 MIXED HYPERLIPIDEMIA: ICD-10-CM

## 2021-06-17 DIAGNOSIS — Z12.5 ENCOUNTER FOR PROSTATE CANCER SCREENING: ICD-10-CM

## 2021-06-17 DIAGNOSIS — I10 ESSENTIAL HYPERTENSION: Primary | ICD-10-CM

## 2021-06-17 RX ORDER — ATORVASTATIN CALCIUM 20 MG/1
TABLET, FILM COATED ORAL
Qty: 90 TABLET | Refills: 0 | Status: SHIPPED | OUTPATIENT
Start: 2021-06-17 | End: 2022-06-27

## 2021-06-23 RX ORDER — LEVOCETIRIZINE DIHYDROCHLORIDE 5 MG/1
5 TABLET, FILM COATED ORAL NIGHTLY
Qty: 10 TABLET | Refills: 0 | Status: SHIPPED | OUTPATIENT
Start: 2021-06-23 | End: 2021-07-01

## 2021-06-23 RX ORDER — AZITHROMYCIN 250 MG/1
TABLET, FILM COATED ORAL
Qty: 6 TABLET | Refills: 0 | Status: SHIPPED | OUTPATIENT
Start: 2021-06-23 | End: 2021-06-28

## 2021-06-23 RX ORDER — PREDNISONE 10 MG/1
10 TABLET ORAL 2 TIMES DAILY
Qty: 6 TABLET | Refills: 0 | Status: SHIPPED | OUTPATIENT
Start: 2021-06-23 | End: 2021-06-26

## 2021-07-01 ENCOUNTER — CLINICAL SUPPORT (OUTPATIENT)
Dept: FAMILY MEDICINE | Facility: CLINIC | Age: 51
End: 2021-07-01
Payer: COMMERCIAL

## 2021-07-01 DIAGNOSIS — Z23 IMMUNIZATION DUE: ICD-10-CM

## 2021-07-01 PROCEDURE — 90471 ZOSTER RECOMBINANT VACCINE: ICD-10-PCS | Mod: S$GLB,,, | Performed by: FAMILY MEDICINE

## 2021-07-01 PROCEDURE — 99999 PR PBB SHADOW E&M-EST. PATIENT-LVL II: CPT | Mod: PBBFAC,,,

## 2021-07-01 PROCEDURE — 99999 PR PBB SHADOW E&M-EST. PATIENT-LVL II: ICD-10-PCS | Mod: PBBFAC,,,

## 2021-07-01 PROCEDURE — 90471 IMMUNIZATION ADMIN: CPT | Mod: S$GLB,,, | Performed by: FAMILY MEDICINE

## 2021-07-01 PROCEDURE — 90750 ZOSTER RECOMBINANT VACCINE: ICD-10-PCS | Mod: S$GLB,,, | Performed by: FAMILY MEDICINE

## 2021-07-01 PROCEDURE — 90750 HZV VACC RECOMBINANT IM: CPT | Mod: S$GLB,,, | Performed by: FAMILY MEDICINE

## 2021-07-30 ENCOUNTER — PATIENT OUTREACH (OUTPATIENT)
Dept: ADMINISTRATIVE | Facility: HOSPITAL | Age: 51
End: 2021-07-30

## 2021-07-30 ENCOUNTER — PATIENT MESSAGE (OUTPATIENT)
Dept: ADMINISTRATIVE | Facility: HOSPITAL | Age: 51
End: 2021-07-30

## 2021-10-08 ENCOUNTER — PATIENT MESSAGE (OUTPATIENT)
Dept: FAMILY MEDICINE | Facility: CLINIC | Age: 51
End: 2021-10-08

## 2021-11-04 ENCOUNTER — TELEPHONE (OUTPATIENT)
Dept: FAMILY MEDICINE | Facility: CLINIC | Age: 51
End: 2021-11-04
Payer: COMMERCIAL

## 2021-11-04 RX ORDER — PREDNISONE 20 MG/1
20 TABLET ORAL DAILY
Qty: 5 TABLET | Refills: 0 | Status: SHIPPED | OUTPATIENT
Start: 2021-11-04 | End: 2021-11-09

## 2021-11-04 RX ORDER — AZELASTINE 1 MG/ML
1 SPRAY, METERED NASAL 2 TIMES DAILY
Qty: 30 ML | Refills: 0 | Status: SHIPPED | OUTPATIENT
Start: 2021-11-04 | End: 2022-08-29

## 2021-11-04 RX ORDER — AZITHROMYCIN 250 MG/1
TABLET, FILM COATED ORAL
Qty: 6 TABLET | Refills: 0 | Status: SHIPPED | OUTPATIENT
Start: 2021-11-04 | End: 2021-11-09

## 2021-11-22 ENCOUNTER — PATIENT MESSAGE (OUTPATIENT)
Dept: FAMILY MEDICINE | Facility: CLINIC | Age: 51
End: 2021-11-22
Payer: COMMERCIAL

## 2022-03-18 ENCOUNTER — PATIENT MESSAGE (OUTPATIENT)
Dept: ADMINISTRATIVE | Facility: HOSPITAL | Age: 52
End: 2022-03-18
Payer: COMMERCIAL

## 2022-05-13 ENCOUNTER — PATIENT MESSAGE (OUTPATIENT)
Dept: FAMILY MEDICINE | Facility: CLINIC | Age: 52
End: 2022-05-13
Payer: COMMERCIAL

## 2022-05-13 DIAGNOSIS — Z00.00 ANNUAL PHYSICAL EXAM: Primary | ICD-10-CM

## 2022-05-14 ENCOUNTER — TELEPHONE (OUTPATIENT)
Dept: FAMILY MEDICINE | Facility: CLINIC | Age: 52
End: 2022-05-14
Payer: COMMERCIAL

## 2022-05-14 RX ORDER — METHYLPREDNISOLONE 4 MG/1
TABLET ORAL
Qty: 21 EACH | Refills: 0 | Status: SHIPPED | OUTPATIENT
Start: 2022-05-14 | End: 2022-06-04

## 2022-05-14 RX ORDER — GABAPENTIN 100 MG/1
100 CAPSULE ORAL 2 TIMES DAILY
Qty: 60 CAPSULE | Refills: 0 | Status: SHIPPED | OUTPATIENT
Start: 2022-05-14 | End: 2022-06-11

## 2022-05-14 NOTE — TELEPHONE ENCOUNTER
About 10 years ago had issues with a herniated disk, had epidural injections and gabapentin x 6 months. Has had intermittent mild flare ups, nothing that needed medications. For about 4 months he has been having some pain in the left shoulder and neck intermittent. About a week ago began having progressively worsening pain in the neck and left shoulder. He can not raise his left arm and shoulder without significant pain.No known acute injury, does karate normally but can not do related to pain.

## 2022-05-16 ENCOUNTER — HOSPITAL ENCOUNTER (OUTPATIENT)
Dept: RADIOLOGY | Facility: HOSPITAL | Age: 52
Discharge: HOME | End: 2022-05-16
Attending: NURSE PRACTITIONER
Payer: COMMERCIAL

## 2022-05-16 ENCOUNTER — OFFICE VISIT (OUTPATIENT)
Dept: FAMILY MEDICINE | Facility: CLINIC | Age: 52
End: 2022-05-16
Payer: COMMERCIAL

## 2022-05-16 VITALS
WEIGHT: 232.5 LBS | TEMPERATURE: 98 F | BODY MASS INDEX: 35.24 KG/M2 | SYSTOLIC BLOOD PRESSURE: 143 MMHG | DIASTOLIC BLOOD PRESSURE: 85 MMHG | HEART RATE: 86 BPM | HEIGHT: 68 IN

## 2022-05-16 DIAGNOSIS — M75.82 ROTATOR CUFF TENDONITIS, LEFT: ICD-10-CM

## 2022-05-16 DIAGNOSIS — M25.512 ACUTE PAIN OF LEFT SHOULDER: Primary | ICD-10-CM

## 2022-05-16 DIAGNOSIS — Z12.11 COLON CANCER SCREENING: ICD-10-CM

## 2022-05-16 DIAGNOSIS — M25.512 ACUTE PAIN OF LEFT SHOULDER: ICD-10-CM

## 2022-05-16 PROCEDURE — 1159F MED LIST DOCD IN RCRD: CPT | Mod: CPTII,S$GLB,, | Performed by: NURSE PRACTITIONER

## 2022-05-16 PROCEDURE — 3077F PR MOST RECENT SYSTOLIC BLOOD PRESSURE >= 140 MM HG: ICD-10-PCS | Mod: CPTII,S$GLB,, | Performed by: NURSE PRACTITIONER

## 2022-05-16 PROCEDURE — 73030 X-RAY EXAM OF SHOULDER: CPT | Mod: 26,LT,, | Performed by: RADIOLOGY

## 2022-05-16 PROCEDURE — 3008F PR BODY MASS INDEX (BMI) DOCUMENTED: ICD-10-PCS | Mod: CPTII,S$GLB,, | Performed by: NURSE PRACTITIONER

## 2022-05-16 PROCEDURE — 99999 PR PBB SHADOW E&M-EST. PATIENT-LVL IV: CPT | Mod: PBBFAC,,, | Performed by: NURSE PRACTITIONER

## 2022-05-16 PROCEDURE — 99214 PR OFFICE/OUTPT VISIT, EST, LEVL IV, 30-39 MIN: ICD-10-PCS | Mod: S$GLB,,, | Performed by: NURSE PRACTITIONER

## 2022-05-16 PROCEDURE — 73030 X-RAY EXAM OF SHOULDER: CPT | Mod: TC,PO,LT

## 2022-05-16 PROCEDURE — 1159F PR MEDICATION LIST DOCUMENTED IN MEDICAL RECORD: ICD-10-PCS | Mod: CPTII,S$GLB,, | Performed by: NURSE PRACTITIONER

## 2022-05-16 PROCEDURE — 3008F BODY MASS INDEX DOCD: CPT | Mod: CPTII,S$GLB,, | Performed by: NURSE PRACTITIONER

## 2022-05-16 PROCEDURE — 1160F PR REVIEW ALL MEDS BY PRESCRIBER/CLIN PHARMACIST DOCUMENTED: ICD-10-PCS | Mod: CPTII,S$GLB,, | Performed by: NURSE PRACTITIONER

## 2022-05-16 PROCEDURE — 3079F PR MOST RECENT DIASTOLIC BLOOD PRESSURE 80-89 MM HG: ICD-10-PCS | Mod: CPTII,S$GLB,, | Performed by: NURSE PRACTITIONER

## 2022-05-16 PROCEDURE — 73030 XR SHOULDER COMPLETE 2 OR MORE VIEWS LEFT: ICD-10-PCS | Mod: 26,LT,, | Performed by: RADIOLOGY

## 2022-05-16 PROCEDURE — 99999 PR PBB SHADOW E&M-EST. PATIENT-LVL IV: ICD-10-PCS | Mod: PBBFAC,,, | Performed by: NURSE PRACTITIONER

## 2022-05-16 PROCEDURE — 3077F SYST BP >= 140 MM HG: CPT | Mod: CPTII,S$GLB,, | Performed by: NURSE PRACTITIONER

## 2022-05-16 PROCEDURE — 1160F RVW MEDS BY RX/DR IN RCRD: CPT | Mod: CPTII,S$GLB,, | Performed by: NURSE PRACTITIONER

## 2022-05-16 PROCEDURE — 3079F DIAST BP 80-89 MM HG: CPT | Mod: CPTII,S$GLB,, | Performed by: NURSE PRACTITIONER

## 2022-05-16 PROCEDURE — 99214 OFFICE O/P EST MOD 30 MIN: CPT | Mod: S$GLB,,, | Performed by: NURSE PRACTITIONER

## 2022-05-16 RX ORDER — SULINDAC 200 MG/1
200 TABLET ORAL 2 TIMES DAILY
Qty: 14 TABLET | Refills: 0 | Status: SHIPPED | OUTPATIENT
Start: 2022-05-16 | End: 2022-05-23

## 2022-05-16 NOTE — TELEPHONE ENCOUNTER
I have signed for the following orders AND/OR meds.  Please call the patient and ask the patient to schedule the testing AND/OR inform about any medications that were sent.      Orders Placed This Encounter   Procedures    Comprehensive Metabolic Panel     Standing Status:   Future     Standing Expiration Date:   5/16/2023    Lipid Panel     Standing Status:   Future     Standing Expiration Date:   5/16/2023    PSA, Screening     Standing Status:   Future     Standing Expiration Date:   5/17/2023

## 2022-05-16 NOTE — PROGRESS NOTES
Subjective:       Patient ID: Rupert Mir is a 51 y.o. male.    Chief Complaint: Shoulder Pain and Neck Pain    Shoulder Pain   The pain is present in the neck, left shoulder and left arm. This is a recurrent problem. The current episode started in the past 7 days. The problem occurs constantly. The problem has been rapidly worsening. The quality of the pain is described as aching and burning. Associated symptoms include a limited range of motion and tingling. Associated symptoms comments: Decreased strength. The symptoms are aggravated by activity. Treatments tried: gabapentin, medrol, Advil. The treatment provided mild relief.     Health maintenance needs to updated including:  Colonoscopy       Review of Systems   Constitutional: Negative for fatigue and unexpected weight change.   HENT: Negative for ear pain and sore throat.    Eyes: Negative.  Negative for pain and visual disturbance.   Respiratory: Negative for cough and shortness of breath.    Cardiovascular: Negative for palpitations.   Gastrointestinal: Negative for abdominal pain, diarrhea, nausea and vomiting.   Genitourinary: Negative for dysuria and frequency.   Musculoskeletal: Positive for arthralgias. Negative for myalgias.   Skin: Negative for color change and rash.   Neurological: Positive for tingling. Negative for dizziness.   Psychiatric/Behavioral: Negative for sleep disturbance. The patient is not nervous/anxious.        Vitals:    05/16/22 0853   BP: (!) 143/85   Pulse: 86   Temp: 98.4 °F (36.9 °C)       Objective:     Current Outpatient Medications   Medication Sig Dispense Refill    amLODIPine (NORVASC) 10 MG tablet TAKE 1 TABLET(10 MG) BY MOUTH EVERY DAY 90 tablet 3    atorvastatin (LIPITOR) 20 MG tablet TAKE 1 TABLET(20 MG) BY MOUTH EVERY DAY 90 tablet 0    gabapentin (NEURONTIN) 100 MG capsule Take 1 capsule (100 mg total) by mouth 2 (two) times daily. 60 capsule 0    methylPREDNISolone (MEDROL DOSEPACK) 4 mg tablet use as  directed 21 each 0    multivitamin (THERAGRAN) per tablet Take 1 tablet by mouth.      azelastine (ASTELIN) 137 mcg (0.1 %) nasal spray 1 spray (137 mcg total) by Nasal route 2 (two) times daily. (Patient not taking: Reported on 5/16/2022) 30 mL 0    fluticasone (FLONASE) 50 mcg/actuation nasal spray Use 2 puffs in each nostril q day. (Patient not taking: Reported on 5/16/2022) 16 g 0    levocetirizine (XYZAL) 5 MG tablet TAKE 1 TABLET(5 MG) BY MOUTH EVERY EVENING FOR 10 DAYS (Patient not taking: Reported on 5/16/2022) 10 tablet 0    sulindac (CLINORIL) 200 MG Tab Take 1 tablet (200 mg total) by mouth 2 (two) times daily. for 7 days 14 tablet 0     No current facility-administered medications for this visit.       Physical Exam  Vitals and nursing note reviewed.   Constitutional:       General: He is not in acute distress.     Appearance: He is well-developed.   HENT:      Head: Normocephalic and atraumatic.   Eyes:      Pupils: Pupils are equal, round, and reactive to light.   Cardiovascular:      Rate and Rhythm: Normal rate and regular rhythm.   Pulmonary:      Effort: Pulmonary effort is normal.      Breath sounds: Normal breath sounds.   Musculoskeletal:      Left shoulder: Tenderness present. Decreased range of motion.      Cervical back: Normal range of motion and neck supple.   Skin:     General: Skin is warm and dry.      Findings: No rash.   Neurological:      Mental Status: He is alert and oriented to person, place, and time.   Psychiatric:         Thought Content: Thought content normal.         Assessment:       1. Acute pain of left shoulder    2. Rotator cuff tendonitis, left    3. Colon cancer screening        Plan:   Acute pain of left shoulder  -     X-Ray Shoulder 2 or More Views Left; Future; Expected date: 05/16/2022    Rotator cuff tendonitis, left  -     X-Ray Shoulder 2 or More Views Left; Future; Expected date: 05/16/2022    Colon cancer screening  -     Cologuard Screening (Multitarget  Stool DNA); Future; Expected date: 05/16/2022    Other orders  -     sulindac (CLINORIL) 200 MG Tab; Take 1 tablet (200 mg total) by mouth 2 (two) times daily. for 7 days  Dispense: 14 tablet; Refill: 0        No follow-ups on file.    There are no Patient Instructions on file for this visit.

## 2022-05-16 NOTE — TELEPHONE ENCOUNTER
He was seen this morning for shoulder pain, can you order labs or do you want him to come back in for an annual?

## 2022-05-19 ENCOUNTER — TELEPHONE (OUTPATIENT)
Dept: FAMILY MEDICINE | Facility: CLINIC | Age: 52
End: 2022-05-19
Payer: COMMERCIAL

## 2022-05-19 DIAGNOSIS — Z00.00 ANNUAL PHYSICAL EXAM: Primary | ICD-10-CM

## 2022-05-19 NOTE — TELEPHONE ENCOUNTER
I have signed for the following orders AND/OR meds.  Please call the patient and ask the patient to schedule the testing AND/OR inform about any medications that were sent.      Orders Placed This Encounter   Procedures    Comprehensive Metabolic Panel     Standing Status:   Future     Number of Occurrences:   1     Standing Expiration Date:   7/18/2023    PSA, Screening     Standing Status:   Future     Number of Occurrences:   1     Standing Expiration Date:   7/18/2023    Lipid Panel     Standing Status:   Future     Number of Occurrences:   1     Standing Expiration Date:   7/18/2023

## 2022-05-19 NOTE — TELEPHONE ENCOUNTER
----- Message from Tisha Villatoro sent at 5/19/2022  8:14 AM CDT -----  Contact: [employer]  Cici called regarding getting the orders for Rupert sent to [facility] [phone] - fax.        Thanks  kb

## 2022-05-26 ENCOUNTER — PATIENT MESSAGE (OUTPATIENT)
Dept: FAMILY MEDICINE | Facility: CLINIC | Age: 52
End: 2022-05-26
Payer: COMMERCIAL

## 2022-06-09 ENCOUNTER — OFFICE VISIT (OUTPATIENT)
Dept: FAMILY MEDICINE | Facility: CLINIC | Age: 52
End: 2022-06-09
Payer: COMMERCIAL

## 2022-06-09 DIAGNOSIS — Z11.52 ENCOUNTER FOR SCREENING LABORATORY TESTING FOR COVID-19 VIRUS: Primary | ICD-10-CM

## 2022-06-09 LAB
CTP QC/QA: YES
SARS-COV-2 RDRP RESP QL NAA+PROBE: NEGATIVE

## 2022-06-09 PROCEDURE — 99999 PR PBB SHADOW E&M-EST. PATIENT-LVL II: ICD-10-PCS | Mod: PBBFAC,,, | Performed by: NURSE PRACTITIONER

## 2022-06-09 PROCEDURE — 99212 OFFICE O/P EST SF 10 MIN: CPT | Mod: S$GLB,,, | Performed by: NURSE PRACTITIONER

## 2022-06-09 PROCEDURE — 1160F PR REVIEW ALL MEDS BY PRESCRIBER/CLIN PHARMACIST DOCUMENTED: ICD-10-PCS | Mod: CPTII,S$GLB,, | Performed by: NURSE PRACTITIONER

## 2022-06-09 PROCEDURE — U0002: ICD-10-PCS | Mod: QW,S$GLB,, | Performed by: NURSE PRACTITIONER

## 2022-06-09 PROCEDURE — 1159F MED LIST DOCD IN RCRD: CPT | Mod: CPTII,S$GLB,, | Performed by: NURSE PRACTITIONER

## 2022-06-09 PROCEDURE — 1159F PR MEDICATION LIST DOCUMENTED IN MEDICAL RECORD: ICD-10-PCS | Mod: CPTII,S$GLB,, | Performed by: NURSE PRACTITIONER

## 2022-06-09 PROCEDURE — 1160F RVW MEDS BY RX/DR IN RCRD: CPT | Mod: CPTII,S$GLB,, | Performed by: NURSE PRACTITIONER

## 2022-06-09 PROCEDURE — 99212 PR OFFICE/OUTPT VISIT, EST, LEVL II, 10-19 MIN: ICD-10-PCS | Mod: S$GLB,,, | Performed by: NURSE PRACTITIONER

## 2022-06-09 PROCEDURE — U0002 COVID-19 LAB TEST NON-CDC: HCPCS | Mod: QW,S$GLB,, | Performed by: NURSE PRACTITIONER

## 2022-06-09 PROCEDURE — 99999 PR PBB SHADOW E&M-EST. PATIENT-LVL II: CPT | Mod: PBBFAC,,, | Performed by: NURSE PRACTITIONER

## 2022-06-09 NOTE — PROGRESS NOTES
Subjective:       Patient ID: Rupert Mir is a 51 y.o. male.    Chief Complaint: No chief complaint on file.    Patient needs covid 19 rapid test for planned travel, no symptoms or exposure.    Past Medical History:  12/2017: Broken nose  12/2017: Pneumonia  No date: Primary hypercholesterolemia  12/2017: Syncope, vasovagal    Past Surgical History:  No date: bulging disks  No date: TONSILLECTOMY      Comment:  When i was a child approx 4yrs old    Review of patient's family history indicates:  Problem: Arthritis      Relation: Mother          Age of Onset: (Not Specified)  Problem: Arthritis      Relation: Maternal Grandmother          Age of Onset: (Not Specified)  Problem: Diabetes      Relation: Paternal Uncle          Age of Onset: (Not Specified)      Social History    Socioeconomic History      Marital status:     Tobacco Use      Smoking status: Former Smoker      Smokeless tobacco: Former User    Substance and Sexual Activity      Alcohol use: Yes        Alcohol/week: 5.0 standard drinks        Types: 2 Glasses of wine, 2 Cans of beer, 1 Standard drinks or equivalent per week        Comment: Maybe 2 drinks during the week.couple more on weekend      Drug use: Never      Sexual activity: Yes        Partners: Female        Birth control/protection: None      Current Outpatient Medications:  amLODIPine (NORVASC) 10 MG tablet, TAKE 1 TABLET(10 MG) BY MOUTH EVERY DAY, Disp: 90 tablet, Rfl: 3  atorvastatin (LIPITOR) 20 MG tablet, TAKE 1 TABLET(20 MG) BY MOUTH EVERY DAY, Disp: 90 tablet, Rfl: 0  azelastine (ASTELIN) 137 mcg (0.1 %) nasal spray, 1 spray (137 mcg total) by Nasal route 2 (two) times daily. (Patient not taking: Reported on 5/16/2022), Disp: 30 mL, Rfl: 0  fluticasone (FLONASE) 50 mcg/actuation nasal spray, Use 2 puffs in each nostril q day. (Patient not taking: Reported on 5/16/2022), Disp: 16 g, Rfl: 0  gabapentin (NEURONTIN) 100 MG capsule, Take 1 capsule (100 mg total) by mouth 2  (two) times daily., Disp: 60 capsule, Rfl: 0  levocetirizine (XYZAL) 5 MG tablet, TAKE 1 TABLET(5 MG) BY MOUTH EVERY EVENING FOR 10 DAYS (Patient not taking: Reported on 5/16/2022), Disp: 10 tablet, Rfl: 0  multivitamin (THERAGRAN) per tablet, Take 1 tablet by mouth., Disp: , Rfl:     No current facility-administered medications for this visit.      Review of patient's allergies indicates:   -- No known drug allergies     Review of Systems   Constitutional: Negative.    HENT: Negative.    Respiratory: Negative.    Cardiovascular: Negative.    Gastrointestinal: Negative.    Genitourinary: Negative.    Integumentary:  Negative.   Neurological: Negative.          Objective:      Physical Exam  Constitutional:       Appearance: Normal appearance.   HENT:      Head: Normocephalic and atraumatic.   Cardiovascular:      Rate and Rhythm: Normal rate.   Pulmonary:      Effort: Pulmonary effort is normal. No respiratory distress.   Neurological:      General: No focal deficit present.      Mental Status: He is alert and oriented to person, place, and time.   Psychiatric:         Mood and Affect: Mood normal.         Behavior: Behavior normal.         Assessment:       Problem List Items Addressed This Visit    None     Visit Diagnoses     Encounter for screening laboratory testing for COVID-19 virus    -  Primary    Relevant Orders    POCT COVID-19 Rapid Screening (Completed)          Plan:       Rapid Covid 19 test collected and resulted negative. Results discussed with patient, advised to follow up for any further concerns.

## 2022-06-27 DIAGNOSIS — E78.2 MIXED HYPERLIPIDEMIA: ICD-10-CM

## 2022-06-27 RX ORDER — ATORVASTATIN CALCIUM 20 MG/1
TABLET, FILM COATED ORAL
Qty: 90 TABLET | Refills: 0 | Status: SHIPPED | OUTPATIENT
Start: 2022-06-27 | End: 2022-08-29 | Stop reason: SDUPTHER

## 2022-06-27 NOTE — TELEPHONE ENCOUNTER
Care Due:                  Date            Visit Type   Department     Provider  --------------------------------------------------------------------------------                                EP -                              PRIMARY      [facility]  Last Visit: 04-      CARE ([facility])   MEDICINE       Lex Whipple  Next Visit: None Scheduled  None         None Found                                                            Last  Test          Frequency    Reason                     Performed    Due Date  --------------------------------------------------------------------------------    Office Visit  12 months..  atorvastatin.............  04- 04-    CMP.........  12 months..  atorvastatin.............  Not Found    Overdue    Lipid Panel.  12 months..  atorvastatin.............  Not Found    Overdue    [employer] Embedded Care Gaps. Reference number: [id number]. 6/27/2022   11:32:44 AM CDT

## 2022-06-27 NOTE — TELEPHONE ENCOUNTER
Refill Routing Note   Medication(s) are not appropriate for processing by [facility] for the following reason(s):      - Required laboratory values are outdated    [facility] action(s):  Defer          Medication reconciliation completed: No     Appointments  past 12m or future 3m with PCP    Date Provider   Last Visit   4/23/2021 Lex Whipple MD   Next Visit   Visit date not found Lex Whipple MD   ED visits in past 90 days: 0        Note composed:1:10 PM 06/27/2022

## 2022-06-27 NOTE — TELEPHONE ENCOUNTER
I have filled the requested medication. They are due for a follow up appointment. Please call the patient and schedule this. No additional refills will be sent until they are seen

## 2022-07-08 ENCOUNTER — PATIENT MESSAGE (OUTPATIENT)
Dept: FAMILY MEDICINE | Facility: CLINIC | Age: 52
End: 2022-07-08
Payer: COMMERCIAL

## 2022-08-03 DIAGNOSIS — I10 ESSENTIAL HYPERTENSION: ICD-10-CM

## 2022-08-03 RX ORDER — AMLODIPINE BESYLATE 10 MG/1
10 TABLET ORAL DAILY
Qty: 90 TABLET | Refills: 0 | Status: SHIPPED | OUTPATIENT
Start: 2022-08-03 | End: 2022-08-29 | Stop reason: SDUPTHER

## 2022-08-03 NOTE — TELEPHONE ENCOUNTER
The patient is overdue to see me.   Arrange and appointment and schedule labs/immunizations needed in Health Maintenance to close the care gaps.  If appropriate, a virtual visit may be used for this.  I have refilled the requested medicine x 1.    Health Maintenance Due   Topic Date Due    Hepatitis C Screening  Never done    HIV Screening  Never done    Colorectal Cancer Screening  Never done    Lipid Panel  09/04/2020    COVID-19 Vaccine (4 - Booster for Pfizer series) 04/20/2022

## 2022-08-06 LAB
ALBUMIN SERPL-MCNC: 4.1 G/DL (ref 3.6–5.1)
ALBUMIN/GLOB SERPL: 1.6 (CALC) (ref 1–2.5)
ALP SERPL-CCNC: 68 U/L (ref 35–144)
ALT SERPL-CCNC: 33 U/L (ref 9–46)
AST SERPL-CCNC: 20 U/L (ref 10–35)
BILIRUB SERPL-MCNC: 0.5 MG/DL (ref 0.2–1.2)
BUN SERPL-MCNC: 18 MG/DL (ref 7–25)
BUN/CREAT SERPL: ABNORMAL (CALC) (ref 6–22)
CALCIUM SERPL-MCNC: 9.1 MG/DL (ref 8.6–10.3)
CHLORIDE SERPL-SCNC: 107 MMOL/L (ref 98–110)
CHOLEST SERPL-MCNC: 187 MG/DL
CHOLEST/HDLC SERPL: 3.7 (CALC)
CO2 SERPL-SCNC: 29 MMOL/L (ref 20–32)
CREAT SERPL-MCNC: 0.94 MG/DL (ref 0.7–1.3)
EGFR: 97 ML/MIN/1.73M2
GLOBULIN SER CALC-MCNC: 2.6 G/DL (CALC) (ref 1.9–3.7)
GLUCOSE SERPL-MCNC: 106 MG/DL (ref 65–99)
HDLC SERPL-MCNC: 51 MG/DL
LDLC SERPL CALC-MCNC: 111 MG/DL (CALC)
NONHDLC SERPL-MCNC: 136 MG/DL (CALC)
POTASSIUM SERPL-SCNC: 4.3 MMOL/L (ref 3.5–5.3)
PROT SERPL-MCNC: 6.7 G/DL (ref 6.1–8.1)
PSA SERPL-MCNC: 0.64 NG/ML
SODIUM SERPL-SCNC: 142 MMOL/L (ref 135–146)
TRIGL SERPL-MCNC: 133 MG/DL

## 2022-08-12 DIAGNOSIS — R73.9 HYPERGLYCEMIA: Primary | ICD-10-CM

## 2022-08-12 NOTE — PROGRESS NOTES
I have reviewed the labs and am recommending the following:  CMP/BMP NORMAL-The electrolytes all appear stable at this time except for the glucose which is borderline.  This includes kidney functions along with routine electrolytes like potassium and sodium.  If it was a CMP test, the liver enzymes were noted to be stable also.  LIPID NORMAL SCREEN-The cholesterol panel screening showed levels that are considered at target at this time.  Recheck each year.   The current value for the PSA is considered normal.  Please recheck this in 1 year.     WORKUP:HYPERGLYCEMIA I have reviewed your labs and it appears that you have a high fasting glucose (sugar level) and this may indicate that your have diabetes.  To determine what is going on, I will need additional blood testing.  Please schedule a 2 hour glucose challenge test along with a hemoglobin A1c blood test.  I will ask my nurse to arrange this for you soon.  Please followup with me 1 week after the testing is done.    Health maintenance items that remain on your list that need to be arranged are listed below.   Please notify me if you are prepared to get them completed.    Hepatitis C Screening Never done  HIV Screening Never done  Colorectal Cancer Screening Never done  COVID-19 Vaccine(4 - Booster for Pfizer series) due on 04/20/2022    Dr. Lex Whipple

## 2022-08-29 ENCOUNTER — OFFICE VISIT (OUTPATIENT)
Dept: FAMILY MEDICINE | Facility: CLINIC | Age: 52
End: 2022-08-29
Payer: COMMERCIAL

## 2022-08-29 ENCOUNTER — LAB VISIT (OUTPATIENT)
Dept: LAB | Facility: HOSPITAL | Age: 52
End: 2022-08-29
Attending: FAMILY MEDICINE
Payer: COMMERCIAL

## 2022-08-29 VITALS
DIASTOLIC BLOOD PRESSURE: 89 MMHG | HEART RATE: 82 BPM | SYSTOLIC BLOOD PRESSURE: 138 MMHG | WEIGHT: 230 LBS | TEMPERATURE: 98 F | BODY MASS INDEX: 34.86 KG/M2 | HEIGHT: 68 IN

## 2022-08-29 DIAGNOSIS — Z00.00 ANNUAL PHYSICAL EXAM: ICD-10-CM

## 2022-08-29 DIAGNOSIS — E78.2 MIXED HYPERLIPIDEMIA: ICD-10-CM

## 2022-08-29 DIAGNOSIS — E78.2 MIXED HYPERLIPIDEMIA: Primary | ICD-10-CM

## 2022-08-29 DIAGNOSIS — E66.9 OBESITY (BMI 30-39.9): ICD-10-CM

## 2022-08-29 DIAGNOSIS — I10 ESSENTIAL HYPERTENSION: ICD-10-CM

## 2022-08-29 DIAGNOSIS — Z00.00 ANNUAL PHYSICAL EXAM: Primary | ICD-10-CM

## 2022-08-29 LAB
ALBUMIN SERPL BCP-MCNC: 4.1 G/DL (ref 3.5–5.2)
ALP SERPL-CCNC: 76 U/L (ref 55–135)
ALT SERPL W/O P-5'-P-CCNC: 36 U/L (ref 10–44)
ANION GAP SERPL CALC-SCNC: 10 MMOL/L (ref 8–16)
AST SERPL-CCNC: 24 U/L (ref 10–40)
BILIRUB SERPL-MCNC: 0.7 MG/DL (ref 0.1–1)
BUN SERPL-MCNC: 13 MG/DL (ref 6–20)
CALCIUM SERPL-MCNC: 9.3 MG/DL (ref 8.7–10.5)
CHLORIDE SERPL-SCNC: 104 MMOL/L (ref 95–110)
CHOLEST SERPL-MCNC: 208 MG/DL (ref 120–199)
CHOLEST/HDLC SERPL: 3.8 {RATIO} (ref 2–5)
CO2 SERPL-SCNC: 26 MMOL/L (ref 23–29)
COMPLEXED PSA SERPL-MCNC: 0.81 NG/ML (ref 0–4)
CREAT SERPL-MCNC: 0.8 MG/DL (ref 0.5–1.4)
EST. GFR  (NO RACE VARIABLE): >60 ML/MIN/1.73 M^2
GLUCOSE SERPL-MCNC: 97 MG/DL (ref 70–110)
HCV AB SERPL QL IA: NORMAL
HDLC SERPL-MCNC: 55 MG/DL (ref 40–75)
HDLC SERPL: 26.4 % (ref 20–50)
HIV 1+2 AB+HIV1 P24 AG SERPL QL IA: NORMAL
LDLC SERPL CALC-MCNC: 123.6 MG/DL (ref 63–159)
NONHDLC SERPL-MCNC: 153 MG/DL
POTASSIUM SERPL-SCNC: 4 MMOL/L (ref 3.5–5.1)
PROT SERPL-MCNC: 7.8 G/DL (ref 6–8.4)
SODIUM SERPL-SCNC: 140 MMOL/L (ref 136–145)
TRIGL SERPL-MCNC: 147 MG/DL (ref 30–150)

## 2022-08-29 PROCEDURE — 1159F MED LIST DOCD IN RCRD: CPT | Mod: CPTII,S$GLB,, | Performed by: FAMILY MEDICINE

## 2022-08-29 PROCEDURE — 87389 HIV-1 AG W/HIV-1&-2 AB AG IA: CPT | Performed by: FAMILY MEDICINE

## 2022-08-29 PROCEDURE — 3075F SYST BP GE 130 - 139MM HG: CPT | Mod: CPTII,S$GLB,, | Performed by: FAMILY MEDICINE

## 2022-08-29 PROCEDURE — 3008F BODY MASS INDEX DOCD: CPT | Mod: CPTII,S$GLB,, | Performed by: FAMILY MEDICINE

## 2022-08-29 PROCEDURE — 36415 COLL VENOUS BLD VENIPUNCTURE: CPT | Mod: PO | Performed by: FAMILY MEDICINE

## 2022-08-29 PROCEDURE — 99396 PR PREVENTIVE VISIT,EST,40-64: ICD-10-PCS | Mod: S$GLB,,, | Performed by: FAMILY MEDICINE

## 2022-08-29 PROCEDURE — 86803 HEPATITIS C AB TEST: CPT | Performed by: FAMILY MEDICINE

## 2022-08-29 PROCEDURE — 3008F PR BODY MASS INDEX (BMI) DOCUMENTED: ICD-10-PCS | Mod: CPTII,S$GLB,, | Performed by: FAMILY MEDICINE

## 2022-08-29 PROCEDURE — 99999 PR PBB SHADOW E&M-EST. PATIENT-LVL III: CPT | Mod: PBBFAC,,, | Performed by: FAMILY MEDICINE

## 2022-08-29 PROCEDURE — 84153 ASSAY OF PSA TOTAL: CPT | Performed by: FAMILY MEDICINE

## 2022-08-29 PROCEDURE — 80061 LIPID PANEL: CPT | Performed by: FAMILY MEDICINE

## 2022-08-29 PROCEDURE — 1159F PR MEDICATION LIST DOCUMENTED IN MEDICAL RECORD: ICD-10-PCS | Mod: CPTII,S$GLB,, | Performed by: FAMILY MEDICINE

## 2022-08-29 PROCEDURE — 3075F PR MOST RECENT SYSTOLIC BLOOD PRESS GE 130-139MM HG: ICD-10-PCS | Mod: CPTII,S$GLB,, | Performed by: FAMILY MEDICINE

## 2022-08-29 PROCEDURE — 99999 PR PBB SHADOW E&M-EST. PATIENT-LVL III: ICD-10-PCS | Mod: PBBFAC,,, | Performed by: FAMILY MEDICINE

## 2022-08-29 PROCEDURE — 3079F DIAST BP 80-89 MM HG: CPT | Mod: CPTII,S$GLB,, | Performed by: FAMILY MEDICINE

## 2022-08-29 PROCEDURE — 80053 COMPREHEN METABOLIC PANEL: CPT | Performed by: FAMILY MEDICINE

## 2022-08-29 PROCEDURE — 99396 PREV VISIT EST AGE 40-64: CPT | Mod: S$GLB,,, | Performed by: FAMILY MEDICINE

## 2022-08-29 PROCEDURE — 3079F PR MOST RECENT DIASTOLIC BLOOD PRESSURE 80-89 MM HG: ICD-10-PCS | Mod: CPTII,S$GLB,, | Performed by: FAMILY MEDICINE

## 2022-08-29 RX ORDER — ATORVASTATIN CALCIUM 40 MG/1
40 TABLET, FILM COATED ORAL DAILY
Qty: 90 TABLET | Refills: 0 | Status: SHIPPED | OUTPATIENT
Start: 2022-08-29 | End: 2023-01-06 | Stop reason: SDUPTHER

## 2022-08-29 RX ORDER — AMLODIPINE BESYLATE 10 MG/1
10 TABLET ORAL DAILY
Qty: 90 TABLET | Refills: 3 | Status: SHIPPED | OUTPATIENT
Start: 2022-08-29 | End: 2023-01-06

## 2022-08-29 RX ORDER — ATORVASTATIN CALCIUM 20 MG/1
20 TABLET, FILM COATED ORAL DAILY
Qty: 90 TABLET | Refills: 3 | Status: SHIPPED | OUTPATIENT
Start: 2022-08-29 | End: 2022-08-29

## 2022-08-29 NOTE — PROGRESS NOTES
Subjective:      Patient ID: Rupert Mir is a 51 y.o. male.    Chief Complaint: Follow-up    Problem List Items Addressed This Visit       Essential hypertension    Overview     The patient presents with essential hypertension.  The patient is tolerating the medication well and is in excellent compliance.  The patient is experiencing no side effects.  Counseling was offered regarding low salt diets.  The patient has a reduced salt intake.  The patient denies chest pain, palpitations, shortness of breath, dyspnea on exertion, left or murmur neck pain, nausea, vomiting, diaphoresis, paroxysmal nocturnal dyspnea, and orthopnea.   Hypertension Medications               amLODIPine (NORVASC) 10 MG tablet Take 1 tablet (10 mg total) by mouth once daily.            Relevant Medications    amLODIPine (NORVASC) 10 MG tablet    Mixed hyperlipidemia    Overview     The patient presents with hyperlipidemia.  The patient reports tolerating the medication well and is in excellent compliance.  There have been no medication side effects.  The patient denies chest pain, neuropathy, and myalgias.  The patient has reduced fat intake and has been exercising.  Current treatment has included the medications listed in the med card.    Lab Results   Component Value Date    CHOL 232 (H) 06/15/2018    CHOL 196 08/08/2017    CHOL 172 05/11/2012       Lab Results   Component Value Date    HDL 60 06/15/2018    HDL 49 08/08/2017    HDL 37 (L) 05/11/2012       Lab Results   Component Value Date    LDLCALC 150 (H) 06/15/2018    LDLCALC 119 08/08/2017    LDLCALC 117.0 05/11/2012       Lab Results   Component Value Date    TRIG 108 06/15/2018    TRIG 141 08/08/2017    TRIG 91 05/11/2012       Lab Results   Component Value Date    CHOLHDL 3.9 06/15/2018    CHOLHDL 4.0 08/08/2017    CHOLHDL 21.5 05/11/2012     Lab Results   Component Value Date    ALT 55 (H) 06/15/2018    AST 28 06/15/2018    ALKPHOS 69 06/15/2018    BILITOT 0.9 06/15/2018             Relevant Medications    atorvastatin (LIPITOR) 20 MG tablet    Obesity (BMI 30-39.9)    Overview     The patient presents with obesity.  Denies bulimia, amenorrhea, cold intolerance, edema, hip pain, hirsutism, knee pain, polydipsia, polyuria, thirst and weakness.  The patient does not perform regular exercise.  Previous treatments for obesity :self-directed dieting without success.  The patient and I discussed the importance of exercise.    Wt Readings from Last 4 Encounters:   08/29/22 104.3 kg (230 lb)   05/16/22 105.5 kg (232 lb 8 oz)   04/23/21 96.6 kg (213 lb)   03/05/20 103.8 kg (228 lb 12.8 oz)               Other Visit Diagnoses       Annual physical exam    -  Primary    Relevant Orders    Cologuard Screening (Multitarget Stool DNA)    Comprehensive Metabolic Panel    Lipid Panel    PSA, Screening    HIV 1/2 Ag/Ab (4th Gen)    Hepatitis C Antibody    Urinalysis            The patient's Health Maintenance was reviewed and the following appears to be due:   Health Maintenance Due   Topic Date Due    Hepatitis C Screening  Never done    HIV Screening  Never done    Colorectal Cancer Screening  Never done    COVID-19 Vaccine (4 - Booster for Pfizer series) 04/20/2022       Past Medical History:  Past Medical History:   Diagnosis Date    Broken nose 12/2017    Pneumonia 12/2017    Primary hypercholesterolemia     Syncope, vasovagal 12/2017     Past Surgical History:   Procedure Laterality Date    bulging disks      TONSILLECTOMY      When i was a child approx 4yrs old     Review of patient's allergies indicates:   Allergen Reactions    No known drug allergies      Current Outpatient Medications on File Prior to Visit   Medication Sig Dispense Refill    multivitamin (THERAGRAN) per tablet Take 1 tablet by mouth.      [DISCONTINUED] albuterol (VENTOLIN HFA) 90 mcg/actuation inhaler Inhale 2 puffs into the lungs every 6 (six) hours as needed. Rescue 8 g 0    [DISCONTINUED] amLODIPine (NORVASC) 10 MG  tablet Take 1 tablet (10 mg total) by mouth once daily. 90 tablet 0    [DISCONTINUED] atorvastatin (LIPITOR) 20 MG tablet TAKE 1 TABLET(20 MG) BY MOUTH EVERY DAY 90 tablet 0    [DISCONTINUED] azelastine (ASTELIN) 137 mcg (0.1 %) nasal spray 1 spray (137 mcg total) by Nasal route 2 (two) times daily. (Patient not taking: No sig reported) 30 mL 0    [DISCONTINUED] fluticasone (FLONASE) 50 mcg/actuation nasal spray Use 2 puffs in each nostril q day. (Patient not taking: No sig reported) 16 g 0    [DISCONTINUED] gabapentin (NEURONTIN) 100 MG capsule TAKE 1 CAPSULE(100 MG) BY MOUTH TWICE DAILY (Patient not taking: Reported on 8/29/2022) 60 capsule 0    [DISCONTINUED] levocetirizine (XYZAL) 5 MG tablet TAKE 1 TABLET(5 MG) BY MOUTH EVERY EVENING FOR 10 DAYS (Patient not taking: No sig reported) 10 tablet 0     No current facility-administered medications on file prior to visit.     Social History     Socioeconomic History    Marital status:    Tobacco Use    Smoking status: Former    Smokeless tobacco: Former   Substance and Sexual Activity    Alcohol use: Yes     Alcohol/week: 5.0 standard drinks     Types: 2 Glasses of wine, 2 Cans of beer, 1 Standard drinks or equivalent per week     Comment: Maybe 2 drinks during the week.couple more on weekend    Drug use: Never    Sexual activity: Yes     Partners: Female     Birth control/protection: None     Family History   Problem Relation Age of Onset    Arthritis Mother     Arthritis Maternal Grandmother     Diabetes Paternal Uncle        Review of Systems   Constitutional: Negative.  Negative for chills, diaphoresis and fever.   HENT:  Negative for congestion, hearing loss, mouth sores, postnasal drip and sore throat.    Eyes:  Negative for pain and visual disturbance.   Respiratory:  Negative for cough, chest tightness, shortness of breath and wheezing.    Cardiovascular:  Negative for chest pain.   Gastrointestinal:  Negative for abdominal pain, anal bleeding, blood  "in stool, constipation, diarrhea, nausea and vomiting.   Genitourinary:  Negative for dysuria and hematuria.   Musculoskeletal:  Negative for back pain, neck pain and neck stiffness.   Skin:  Negative for rash.   Neurological:  Negative for dizziness and weakness.     Objective:   /89   Pulse 82   Temp 98.4 °F (36.9 °C)   Ht 5' 8" (1.727 m)   Wt 104.3 kg (230 lb)   BMI 34.97 kg/m²     Physical Exam  Constitutional:       Appearance: He is well-developed. He is not diaphoretic.   HENT:      Head: Normocephalic and atraumatic.      Right Ear: External ear normal.      Left Ear: External ear normal.      Nose: Nose normal.      Mouth/Throat:      Pharynx: No oropharyngeal exudate.   Eyes:      General: No scleral icterus.        Right eye: No discharge.         Left eye: No discharge.      Conjunctiva/sclera: Conjunctivae normal.      Pupils: Pupils are equal, round, and reactive to light.   Neck:      Thyroid: No thyromegaly.      Vascular: No JVD.   Cardiovascular:      Rate and Rhythm: Normal rate and regular rhythm.      Heart sounds: Normal heart sounds. No murmur heard.    No friction rub. No gallop.   Pulmonary:      Effort: Pulmonary effort is normal. No respiratory distress.      Breath sounds: Normal breath sounds. No wheezing or rales.   Chest:      Chest wall: No tenderness.   Abdominal:      General: Bowel sounds are normal. There is no distension.      Palpations: Abdomen is soft. There is no mass.      Tenderness: There is no abdominal tenderness. There is no guarding or rebound.   Musculoskeletal:         General: No tenderness. Normal range of motion.      Cervical back: Normal range of motion and neck supple.   Lymphadenopathy:      Cervical: No cervical adenopathy.   Skin:     General: Skin is warm and dry.   Neurological:      Mental Status: He is alert and oriented to person, place, and time.      Cranial Nerves: No cranial nerve deficit.      Coordination: Coordination normal. "     Assessment:     1. Annual physical exam    2. Essential hypertension    3. Mixed hyperlipidemia    4. Obesity (BMI 30-39.9)      Plan:   I have changed Rupert Mir's atorvastatin. I am also having him maintain his multivitamin and amLODIPine.  Problem List Items Addressed This Visit       Essential hypertension    Relevant Medications    amLODIPine (NORVASC) 10 MG tablet    Mixed hyperlipidemia    Relevant Medications    atorvastatin (LIPITOR) 20 MG tablet    Obesity (BMI 30-39.9)     Other Visit Diagnoses       Annual physical exam    -  Primary    Relevant Orders    Cologuard Screening (Multitarget Stool DNA)    Comprehensive Metabolic Panel    Lipid Panel    PSA, Screening    HIV 1/2 Ag/Ab (4th Gen)    Hepatitis C Antibody    Urinalysis          No follow-ups on file.    Rupert was seen today for follow-up.    Diagnoses and all orders for this visit:    Annual physical exam  -     Cologuard Screening (Multitarget Stool DNA); Future  -     Comprehensive Metabolic Panel; Future  -     Lipid Panel; Future  -     PSA, Screening; Future  -     HIV 1/2 Ag/Ab (4th Gen); Future  -     Hepatitis C Antibody; Future  -     Urinalysis; Future  -     Cologuard Screening (Multitarget Stool DNA)    Essential hypertension  -     amLODIPine (NORVASC) 10 MG tablet; Take 1 tablet (10 mg total) by mouth once daily.    Mixed hyperlipidemia  -     atorvastatin (LIPITOR) 20 MG tablet; Take 1 tablet (20 mg total) by mouth once daily.    Obesity (BMI 30-39.9)    Start exercise if possible.     Medications Ordered This Encounter   Medications    amLODIPine (NORVASC) 10 MG tablet     Sig: Take 1 tablet (10 mg total) by mouth once daily.     Dispense:  90 tablet     Refill:  3     .    atorvastatin (LIPITOR) 20 MG tablet     Sig: Take 1 tablet (20 mg total) by mouth once daily.     Dispense:  90 tablet     Refill:  3     The patient was instructed to stop the following meds:  Medications Discontinued During This  Encounter   Medication Reason    fluticasone (FLONASE) 50 mcg/actuation nasal spray Patient no longer taking    gabapentin (NEURONTIN) 100 MG capsule Patient no longer taking    azelastine (ASTELIN) 137 mcg (0.1 %) nasal spray Patient no longer taking    levocetirizine (XYZAL) 5 MG tablet Patient no longer taking    atorvastatin (LIPITOR) 20 MG tablet Reorder    amLODIPine (NORVASC) 10 MG tablet Reorder     Orders Placed This Encounter   Procedures    Cologuard Screening (Multitarget Stool DNA)     Standing Status:   Future     Number of Occurrences:   1     Standing Expiration Date:   10/28/2023    Comprehensive Metabolic Panel     Standing Status:   Future     Standing Expiration Date:   8/29/2023    Lipid Panel     Standing Status:   Future     Standing Expiration Date:   8/29/2023    PSA, Screening     Standing Status:   Future     Standing Expiration Date:   8/30/2023    HIV 1/2 Ag/Ab (4th Gen)     Standing Status:   Future     Standing Expiration Date:   10/29/2023    Hepatitis C Antibody     Standing Status:   Future     Standing Expiration Date:   8/29/2023     Order Specific Question:   Release to patient     Answer:   Immediate    Urinalysis     Standing Status:   Future     Standing Expiration Date:   8/30/2023       Medication List with Changes/Refills   Current Medications    MULTIVITAMIN (THERAGRAN) PER TABLET    Take 1 tablet by mouth.   Changed and/or Refilled Medications    Modified Medication Previous Medication    AMLODIPINE (NORVASC) 10 MG TABLET amLODIPine (NORVASC) 10 MG tablet       Take 1 tablet (10 mg total) by mouth once daily.    Take 1 tablet (10 mg total) by mouth once daily.    ATORVASTATIN (LIPITOR) 20 MG TABLET atorvastatin (LIPITOR) 20 MG tablet       Take 1 tablet (20 mg total) by mouth once daily.    TAKE 1 TABLET(20 MG) BY MOUTH EVERY DAY   Discontinued Medications    AZELASTINE (ASTELIN) 137 MCG (0.1 %) NASAL SPRAY    1 spray (137 mcg total) by Nasal route 2 (two) times daily.     FLUTICASONE (FLONASE) 50 MCG/ACTUATION NASAL SPRAY    Use 2 puffs in each nostril q day.    GABAPENTIN (NEURONTIN) 100 MG CAPSULE    TAKE 1 CAPSULE(100 MG) BY MOUTH TWICE DAILY    LEVOCETIRIZINE (XYZAL) 5 MG TABLET    TAKE 1 TABLET(5 MG) BY MOUTH EVERY EVENING FOR 10 DAYS      Medication List with Changes/Refills   Current Medications    MULTIVITAMIN (THERAGRAN) PER TABLET    Take 1 tablet by mouth.       Start Date: --        End Date: --   Changed and/or Refilled Medications    Modified Medication Previous Medication    AMLODIPINE (NORVASC) 10 MG TABLET amLODIPine (NORVASC) 10 MG tablet       Take 1 tablet (10 mg total) by mouth once daily.    Take 1 tablet (10 mg total) by mouth once daily.       Start Date: 8/29/2022 End Date: --    Start Date: 8/3/2022  End Date: 8/29/2022    ATORVASTATIN (LIPITOR) 20 MG TABLET atorvastatin (LIPITOR) 20 MG tablet       Take 1 tablet (20 mg total) by mouth once daily.    TAKE 1 TABLET(20 MG) BY MOUTH EVERY DAY       Start Date: 8/29/2022 End Date: --    Start Date: 6/27/2022 End Date: 8/29/2022   Discontinued Medications    AZELASTINE (ASTELIN) 137 MCG (0.1 %) NASAL SPRAY    1 spray (137 mcg total) by Nasal route 2 (two) times daily.       Start Date: 11/4/2021 End Date: 8/29/2022    FLUTICASONE (FLONASE) 50 MCG/ACTUATION NASAL SPRAY    Use 2 puffs in each nostril q day.       Start Date: 1/19/2018 End Date: 8/29/2022    GABAPENTIN (NEURONTIN) 100 MG CAPSULE    TAKE 1 CAPSULE(100 MG) BY MOUTH TWICE DAILY       Start Date: 6/11/2022 End Date: 8/29/2022    LEVOCETIRIZINE (XYZAL) 5 MG TABLET    TAKE 1 TABLET(5 MG) BY MOUTH EVERY EVENING FOR 10 DAYS       Start Date: 7/8/2021  End Date: 8/29/2022

## 2022-08-30 ENCOUNTER — PATIENT MESSAGE (OUTPATIENT)
Dept: FAMILY MEDICINE | Facility: CLINIC | Age: 52
End: 2022-08-30
Payer: COMMERCIAL

## 2022-08-30 NOTE — PROGRESS NOTES
I have reviewed the labs and am recommending the following:  CMP/BMP NORMAL-The electrolytes all appear stable at this time.  This includes kidney functions along with routine electrolytes like sugar, potassium and sodium.  If it was a CMP test, the liver enzymes were noted to be stable also.  LIPID-ABNORMAL-The LIPID PROFILE is abnormal.  Change in the medicine to include LIPITOR 40 MG A DAY and we need to send a 3 month prescription for this.  We need to order and repeat a LIPID PANEL in 3 months.      Health maintenance items that remain on your list that need to be arranged are listed below.   Please notify me if you are prepared to get them completed.    Hepatitis C Screening Never done  HIV Screening Never done  Colorectal Cancer Screening Never done  COVID-19 Vaccine(4 - Booster for Pfizer series) due on 04/20/2022    Dr. Lex Whipple

## 2022-09-02 NOTE — PROGRESS NOTES
I have reviewed the labs and am recommending the following:  HEPATITIS C SCREEN NORMAL-The screening for Hepatitis C was negative.  HIV SCREEN NORMAL-The screening for HIV is negative.  PSA NORMAL-The PSA screening for prostate cancer is normal.  Recheck annually.      Health maintenance items that remain on your list that need to be arranged are listed below.   Please notify me if you are prepared to get them completed.    Colorectal Cancer Screening Never done  COVID-19 Vaccine(4 - Booster for Pfizer series) due on 04/20/2022  Influenza Vaccine(1) due on 09/01/2022    Dr. Lex Whipple

## 2022-09-09 DIAGNOSIS — J22 LOWER RESPIRATORY INFECTION: Primary | ICD-10-CM

## 2022-09-09 RX ORDER — ALBUTEROL SULFATE 90 UG/1
2 AEROSOL, METERED RESPIRATORY (INHALATION) EVERY 6 HOURS PRN
Qty: 18 G | Refills: 0 | Status: SHIPPED | OUTPATIENT
Start: 2022-09-09 | End: 2022-10-03

## 2022-09-09 RX ORDER — AZITHROMYCIN 250 MG/1
TABLET, FILM COATED ORAL
Qty: 6 TABLET | Refills: 0 | Status: SHIPPED | OUTPATIENT
Start: 2022-09-09 | End: 2022-09-14

## 2022-09-09 RX ORDER — PREDNISONE 20 MG/1
20 TABLET ORAL DAILY
Qty: 5 TABLET | Refills: 0 | Status: SHIPPED | OUTPATIENT
Start: 2022-09-09 | End: 2022-09-14

## 2022-12-23 ENCOUNTER — PATIENT MESSAGE (OUTPATIENT)
Dept: FAMILY MEDICINE | Facility: CLINIC | Age: 52
End: 2022-12-23
Payer: COMMERCIAL

## 2023-01-06 ENCOUNTER — PATIENT MESSAGE (OUTPATIENT)
Dept: FAMILY MEDICINE | Facility: CLINIC | Age: 53
End: 2023-01-06
Payer: COMMERCIAL

## 2023-01-06 RX ORDER — ATORVASTATIN CALCIUM 40 MG/1
40 TABLET, FILM COATED ORAL DAILY
Qty: 90 TABLET | Refills: 3 | Status: SHIPPED | OUTPATIENT
Start: 2023-01-06 | End: 2024-01-08

## 2023-01-06 RX ORDER — ATORVASTATIN CALCIUM 40 MG/1
40 TABLET, FILM COATED ORAL DAILY
Qty: 90 TABLET | Refills: 0 | Status: CANCELLED | OUTPATIENT
Start: 2023-01-06 | End: 2024-01-06

## 2023-04-19 ENCOUNTER — PATIENT MESSAGE (OUTPATIENT)
Dept: ADMINISTRATIVE | Facility: HOSPITAL | Age: 53
End: 2023-04-19
Payer: COMMERCIAL

## 2023-04-23 RX ORDER — ALBUTEROL SULFATE 90 UG/1
2 AEROSOL, METERED RESPIRATORY (INHALATION) EVERY 6 HOURS PRN
Qty: 18 G | Refills: 0 | Status: SHIPPED | OUTPATIENT
Start: 2023-04-23 | End: 2023-06-12

## 2023-04-23 RX ORDER — METHYLPREDNISOLONE 4 MG/1
TABLET ORAL
Qty: 1 EACH | Refills: 0 | Status: SHIPPED | OUTPATIENT
Start: 2023-04-23 | End: 2023-05-14

## 2023-04-23 RX ORDER — AZITHROMYCIN 250 MG/1
TABLET, FILM COATED ORAL
Qty: 6 TABLET | Refills: 0 | Status: SHIPPED | OUTPATIENT
Start: 2023-04-23 | End: 2023-04-28 | Stop reason: ALTCHOICE

## 2023-04-23 RX ORDER — BENZONATATE 200 MG/1
200 CAPSULE ORAL 3 TIMES DAILY PRN
Qty: 30 CAPSULE | Refills: 0 | Status: SHIPPED | OUTPATIENT
Start: 2023-04-23 | End: 2023-05-03

## 2023-04-24 RX ORDER — ALBUTEROL SULFATE 0.83 MG/ML
2.5 SOLUTION RESPIRATORY (INHALATION) EVERY 6 HOURS PRN
Qty: 100 EACH | Refills: 0 | Status: SHIPPED | OUTPATIENT
Start: 2023-04-24 | End: 2024-04-23

## 2023-04-28 ENCOUNTER — OFFICE VISIT (OUTPATIENT)
Dept: FAMILY MEDICINE | Facility: CLINIC | Age: 53
End: 2023-04-28
Payer: COMMERCIAL

## 2023-04-28 VITALS
HEART RATE: 101 BPM | OXYGEN SATURATION: 97 % | HEIGHT: 67 IN | TEMPERATURE: 98 F | WEIGHT: 231.69 LBS | DIASTOLIC BLOOD PRESSURE: 76 MMHG | BODY MASS INDEX: 36.36 KG/M2 | SYSTOLIC BLOOD PRESSURE: 119 MMHG

## 2023-04-28 DIAGNOSIS — R55 VASOVAGAL SYNCOPE: ICD-10-CM

## 2023-04-28 DIAGNOSIS — J20.4 PARAINFLUENZA VIRUS BRONCHITIS: ICD-10-CM

## 2023-04-28 DIAGNOSIS — E66.01 SEVERE OBESITY (BMI 35.0-39.9) WITH COMORBIDITY: ICD-10-CM

## 2023-04-28 DIAGNOSIS — R55 RECURRENT SYNCOPE: ICD-10-CM

## 2023-04-28 DIAGNOSIS — B34.8 INFECTION DUE TO PARAINFLUENZA VIRUS 3: Primary | ICD-10-CM

## 2023-04-28 PROCEDURE — 99214 PR OFFICE/OUTPT VISIT, EST, LEVL IV, 30-39 MIN: ICD-10-PCS | Mod: S$GLB,,, | Performed by: FAMILY MEDICINE

## 2023-04-28 PROCEDURE — 3078F PR MOST RECENT DIASTOLIC BLOOD PRESSURE < 80 MM HG: ICD-10-PCS | Mod: CPTII,S$GLB,, | Performed by: FAMILY MEDICINE

## 2023-04-28 PROCEDURE — 1159F PR MEDICATION LIST DOCUMENTED IN MEDICAL RECORD: ICD-10-PCS | Mod: CPTII,S$GLB,, | Performed by: FAMILY MEDICINE

## 2023-04-28 PROCEDURE — 3008F PR BODY MASS INDEX (BMI) DOCUMENTED: ICD-10-PCS | Mod: CPTII,S$GLB,, | Performed by: FAMILY MEDICINE

## 2023-04-28 PROCEDURE — 99999 PR PBB SHADOW E&M-EST. PATIENT-LVL IV: ICD-10-PCS | Mod: PBBFAC,,, | Performed by: FAMILY MEDICINE

## 2023-04-28 PROCEDURE — 3074F PR MOST RECENT SYSTOLIC BLOOD PRESSURE < 130 MM HG: ICD-10-PCS | Mod: CPTII,S$GLB,, | Performed by: FAMILY MEDICINE

## 2023-04-28 PROCEDURE — 1159F MED LIST DOCD IN RCRD: CPT | Mod: CPTII,S$GLB,, | Performed by: FAMILY MEDICINE

## 2023-04-28 PROCEDURE — 99999 PR PBB SHADOW E&M-EST. PATIENT-LVL IV: CPT | Mod: PBBFAC,,, | Performed by: FAMILY MEDICINE

## 2023-04-28 PROCEDURE — 3078F DIAST BP <80 MM HG: CPT | Mod: CPTII,S$GLB,, | Performed by: FAMILY MEDICINE

## 2023-04-28 PROCEDURE — 3008F BODY MASS INDEX DOCD: CPT | Mod: CPTII,S$GLB,, | Performed by: FAMILY MEDICINE

## 2023-04-28 PROCEDURE — 3074F SYST BP LT 130 MM HG: CPT | Mod: CPTII,S$GLB,, | Performed by: FAMILY MEDICINE

## 2023-04-28 PROCEDURE — 99214 OFFICE O/P EST MOD 30 MIN: CPT | Mod: S$GLB,,, | Performed by: FAMILY MEDICINE

## 2023-04-28 RX ORDER — ONDANSETRON 4 MG/1
4 TABLET, ORALLY DISINTEGRATING ORAL
COMMUNITY
Start: 2023-04-23

## 2023-04-28 NOTE — PROGRESS NOTES
Patient presents for follow-up emergency room visit as below.  He had upper respiratory symptoms with significant cough and had a syncope episode while in bed during a coughing fit.  Diagnosed with viral bronchitis with parainfluenza.  Was having some wheezing.  This seems to be improving though still having some cough.  Completed Z-Konstantin and completing steroid pack today.  No fever chills.  Using albuterol improvement.  He does have prior history of a syncopal episode in 2017 where actually fell and had a fracture under similar circumstances with a coughing fit.  He did have negative stress echocardiogram at [facility] 2019.  Denies angina.  Chest x-ray did not show pneumonia.    Formatting of this note might be different from the original.   REASON FOR EXAM: persistent cough, wheeze, SOB     TECHNICAL FACTORS: 2 views     COMPARISON: 12/3/2017     FINDINGS: Lungs are clear. Lung volumes appear normal. No pleural effusion. Pulmonary vascularity and heart size are within normal limits. The aorta is minimally elongated. Osseous structures appear unremarkable.     IMPRESSION:     No acute findings or significant abnormality.       Electronically signed by Ariel Shanks MD on 4/24/2023 6:54 AM      Everardo Cha MD - 04/23/2023 9:07 PM CDT  Formatting of this note is different from the original.      Triage Note Reviewed    History     Chief Complaint   Patient presents with    Nasal Congestion    Cough    Near Syncope     History of Present Illness  The history is provided by the patient, medical records, the EMS personnel and the spouse.     51 y/o obese M never-smoker with h/o HLD BIB EMS with c/o URI sx and near syncope vs syncope. Pt is here with his wife who is providing most of his hx. They report that he was doing OK until 2 d/ago when he began to experience URI sx with congestion and persistent cough which has become productive of brown sputum. Pt has felt weak and nauseous, with decreased PO intake, and admits  to feeling mildly SOB. He had reportedly been Rx'd azithromycin Z-pack, medrol dosepak and tessalon perles while also taking OTC cough med mucinex and robitussin. Yesterday he began to have diarrhea.    PTA, pt had 2x episodes of what appeared to be near syncope, occurring while actively coughing, while he was lying down. He had just taken a host of his meds at the same time, including 3x pills of the steroid, in an attempt to alleviate his cough. Wife expresses concern that pt has appeared lethargic, and not as responsive during the episodes, and he has had a similar episode causing him to pass out and hit his head in 2017 causing him to have to come to our ED. Wife states that pt appeared to possibly stiffen but had no obvious GTC sz-like activity.    EMS found pt to be oriented with stable vitals. They administered 4mg IM zofran en route to hospital and pt still feels nauseous. Denies abd pain, CP, f/c, other complaints. Pt adds that he had not slept all night last night d/t coughing. PCP is Dr. Whipple.     Review of Systems   Constitutional: Positive for fatigue. Negative for chills and fever.   HENT: Positive for congestion. Negative for sore throat.   Respiratory: Positive for cough and shortness of breath.   Cardiovascular: Negative for chest pain.   Gastrointestinal: Positive for diarrhea and nausea. Negative for abdominal pain, constipation and vomiting.   Genitourinary: Negative for difficulty urinating.   Neurological: Positive for weakness. Negative for seizures, numbness and headaches.   All other systems reviewed and are negative.        No Known Allergies    Past Medical History:   Diagnosis Date    Hyperlipidemia     Past Surgical History:   Procedure Laterality Date    Benign mass removal on hand    Tonsillectomy       Family History   Problem Relation Age of Onset    No Known Problems Mother    No Known Problems Father     Social History     Tobacco Use    Smoking status: Never    Smokeless  "tobacco: Never   Vaping Use    Vaping status: Never Used   Substance Use Topics    Alcohol use: Yes   Comment: Rare use    Drug use: No     Smoking Cessation Program     E-Cigarette/Vaping    E-cigarette/Vaping Use Never User     Physical Exam     Visit Vitals  /88   Pulse 76   Temp 98.8 °F (37.1 °C) (Oral)   Resp 20   Ht 5' 8" (1.727 m)   Wt 220 lb (99.8 kg)   SpO2 97%   BMI 33.45 kg/m²     Physical Exam  Vitals and nursing note reviewed.   Constitutional:   General: He is not in acute distress.  Appearance: He is well-developed. He is not diaphoretic.   Comments: Non-toxic, ill, tired obese middle-aged WM in NAD with wife at bedside   HENT:   Head: Normocephalic and atraumatic.   Nose: Nose normal.   Mouth/Throat:   Mouth: Mucous membranes are dry.   Pharynx: No oropharyngeal exudate.   Eyes:   General:   Right eye: No discharge.   Left eye: No discharge.   Conjunctiva/sclera: Conjunctivae normal.   Pupils: Pupils are equal, round, and reactive to light.   Neck:   Vascular: No JVD.   Cardiovascular:   Rate and Rhythm: Normal rate and regular rhythm.   Pulmonary:   Effort: Pulmonary effort is normal. No respiratory distress.   Breath sounds: Wheezing present.   Comments: +wheezing heard in upper lung fields; speaking in full sentences without accessory muscle use  Abdominal:   Palpations: Abdomen is soft.   Tenderness: There is no abdominal tenderness.   Musculoskeletal:   Cervical back: Normal range of motion and neck supple.   Lymphadenopathy:   Cervical: No cervical adenopathy.   Skin:  General: Skin is warm and dry.   Capillary Refill: Capillary refill takes less than 2 seconds.   Findings: No rash.   Neurological:   Mental Status: He is alert and oriented to person, place, and time.   Comments: CNs grossly intact, no facial droop, asymmetry or gaze devn; MAEW with apparent 5/5 strength BUE/BLE   Psychiatric:   Attention and Perception: Attention normal.   Mood and Affect: Affect is blunt.   Speech: " Speech is delayed.   Behavior: Behavior is slowed and withdrawn.     ED Course     Labs Reviewed   CBC WITH DIFFERENTIAL - Abnormal; Notable for the following components:   Result Value   HGB 13.8 (*)   HCT 41.3 (*)   Neutrophils Percent 82.4 (*)   Lymphocytes Percent 8.6 (*)   Neutrophils Absolute 8.4 (*)   Lymphocytes Absolute 0.9 (*)   All other components within normal limits   COMPREHENSIVE METABOLIC PANEL - Abnormal; Notable for the following components:   Glucose 153 (*)   Calcium 8.8 (*)   All other components within normal limits   RESPIRATORY VIRUS PANEL - Abnormal; Notable for the following components:   Parainfluenza 3 DETECTED (*)   All other components within normal limits   MAGNESIUM   TROPONIN I   COVID-19/RSV/INFLUENZA A & B PCR W/ REFLEX TO RESP VIRUS PANEL   GLOMERULAR FILTRATION RATE     Lab Results for last 36Hrs:  Recent Results (from the past 36 hour(s))   CBC with Differential   Collection Time: 04/23/23 9:23 PM   Result Value Ref Range   WBC 10.2 4.4 - 11.2 10*3/uL   RBC 4.80 4.70 - 6.10 10*6/uL   HGB 13.8 (L) 14.0 - 18.0 g/dL   HCT 41.3 (L) 42.0 - 52.0 %   MCV 86.0 80.0 - 94.0 fL   MCH 28.8 27.0 - 31.0 pg   MCHC 33.4 33.0 - 37.0 g/dL   RDW 12.4 11.5 - 14.5 %   Platelet Count 204 130 - 375 10*3/uL   MPV 9.2 8.7 - 13.0 fL   Neutrophils Percent 82.4 (H) 36.0 - 66.0 %   Lymphocytes Percent 8.6 (L) 21.0 - 50.0 %   Monocytes Percent 5.9 2.0 - 10.0 %   Eosinophils Percent 2.0 0.0 - 10.0 %   Basophils Percent 0.4 0.0 - 1.0 %   Immature Granulocyte % 0.3 0.0 - 0.4 %   Neutrophils Absolute 8.4 (H) 1.4 - 6.5 10*3/uL   Lymphocytes Absolute 0.9 (L) 1.2 - 3.4 10*3/uL   Monocytes Absolute 0.6 0.1 - 1.0 10*3/uL   Eosinophils Absolute 0.2 0.0 - 0.7 10*3/uL   Basophils Absolute 0.0 0.0 - 0.2 10*3/uL   # Immature Granulocyte 0.03 0.00 - 0.03 10*3/uL   Comprehensive metabolic panel   Collection Time: 04/23/23 9:23 PM   Result Value Ref Range   Glucose 153 (H) 65 - 99 mg/dL   Sodium 137 136 - 144 mmol/L    Potassium 3.8 3.6 - 5.1 mmol/L   Chloride 102 101 - 111 mmol/L   CO2 26 22 - 32 mmol/L   BUN 15 8 - 20 mg/dL   Calcium 8.8 (L) 8.9 - 10.3 mg/dL   Creatinine 0.92 0.90 - 1.30 mg/dL   Albumin 4.0 3.5 - 4.8 g/dL   Total Bilirubin 0.5 0.4 - 2.0 mg/dL   ALKP 79 28 - 116 U/L   Total Protein 7.0 6.1 - 7.9 g/dL   ALT 27 5 - 56 U/L   AST 23 12 - 40 U/L   Anion Gap 9 7 - 16 mmol/L   Magnesium   Collection Time: 04/23/23 9:23 PM   Result Value Ref Range   MG 2.0 1.8 - 2.5 mg/dL   Troponin I   Collection Time: 04/23/23 9:23 PM   Result Value Ref Range   Troponin I <0.03 0.00 - 0.04 ng/mL   COVID-19/RSV/INFLUENZA A&B PCR W/ RFX RVP   Collection Time: 04/23/23 9:23 PM   Result Value Ref Range   RSV PCR NEGATIVE NEGATIVE   Influenza A PCR NEGATIVE NEGATIVE   Influenza B PCR NEGATIVE NEGATIVE   SARS CoV 2 RNA NEGATIVE NEGATIVE   Glomerular Filtration Rate   Collection Time: 04/23/23 9:23 PM   Result Value Ref Range   GFR Non African American >60 >59 mL/min   GFR African American >60 >59 mL/min   Respiratory Virus Panel   Collection Time: 04/23/23 9:23 PM   Result Value Ref Range   Adenovirus NOT DETECTED NOT DETECTED   Human Metapneumovirus NOT DETECTED NOT DETECTED   Parainfluenza 1 NOT DETECTED NOT DETECTED   Parainfluenza 2 NOT DETECTED NOT DETECTED   Parainfluenza 3 DETECTED (A) NOT DETECTED   Parainfluenza 4 NOT DETECTED NOT DETECTED   Rhinovirus NOT DETECTED NOT DETECTED   B. pertussis NOT DETECTED NOT DETECTED   B. parapertussis/bronch NOT DETECTED NOT DETECTED   B. holmesii NOT DETECTED NOT DETECTED   ECG 12 lead X 3   Collection Time: 04/23/23 9:48 PM   Result Value Ref Range   Ventricular Rate 79 BPM   P-R Interval 162 ms   QRS Duration 108 ms   Q-T Interval 398 ms   QTC Calculation 456 ms   Calculated P Axis 53 degrees   Calculated R Axis 10 degrees   Calculated T Axis 29 degrees   Interpretation   Normal sinus rhythm  Possible Inferior infarct , age undetermined  Abnormal ECG  When compared with ECG of 03-DEC-2017  23:26,  No significant change was found  Confirmed by MIGUEL ÁNGEL BRUCE ([id number]) on 4/24/2023 6:41:17 AM      Diagnostic Results for last 36Hrs:  XR Chest PA And Lateral    Result Date: 4/24/2023  REASON FOR EXAM: persistent cough, wheeze, SOB TECHNICAL FACTORS: 2 views COMPARISON: 12/3/2017 FINDINGS: Lungs are clear. Lung volumes appear normal. No pleural effusion. Pulmonary vascularity and heart size are within normal limits. The aorta is minimally elongated. Osseous structures appear unremarkable.     No acute findings or significant abnormality. Electronically signed by Ariel Shanks MD on 4/24/2023 6:54 AM     Wet Read Results   XR Chest PA And Lateral   Final Result     No acute findings or significant abnormality.       Electronically signed by Ariel Shanks MD on 4/24/2023 6:54 AM         Medications   0.9% NaCl bolus 1,000 mL (0 mLs Intravenous Complete 4/23/23 2254)   ondansetron (ZOFRAN) injection 4 mg (4 mg Intravenous $Given 4/23/23 2121)   ipratropium-albuteroL (DUONEB) 0.5 mg-3 mg(2.5 mg base)/3 mL nebulizer solution 3 mL (3 mLs Nebulization $Given 4/23/23 2139)     Procedures        Medical Decision Making  Acute bronchitis, unspecified organism: complicated acute illness or injury with systemic symptoms  Acute viral bronchitis: complicated acute illness or injury with systemic symptoms  Acute viral syndrome: complicated acute illness or injury with systemic symptoms  Hyperglycemia: acute illness or injury  Infection due to parainfluenza virus 3: complicated acute illness or injury with systemic symptoms  Lymphocytopenia: acute illness or injury with systemic symptoms  Near syncope: complicated acute illness or injury with systemic symptoms  Nonspecific syndrome suggestive of viral illness: complicated acute illness or injury with systemic symptoms  Amount and/or Complexity of Data Reviewed  Independent Historian: spouse and EMS  External Data Reviewed: notes.  Labs: ordered. Decision-making details  documented in ED Course.  Radiology: ordered and independent interpretation performed. Decision-making details documented in ED Course.  ECG/medicine tests: ordered and independent interpretation performed. Decision-making details documented in ED Course.    Risk  OTC drugs.  Prescription drug management.    EKG (2148): as independently interpreted by me, the ED physician, NSR 79 bpm without acute ST and T-wave changes. No e/o acute ischemia, arrhythmia, or interval changes.  ms. QTc 456 ms.    CXR: I, the ED physician, have independently viewed and interpreted the CXR: questionable opacities, possible pneumonitis, no consolidn / effusion or PTX. Radiology interpretation pending.    MDM & ED Course: Pt presented as above. Initial ddx included but not limited to: viral syndrome, bronchitis, PNA, dehydration, vasovagal, cardiac, among others. Pt had some wheezing and minimally lower O2 sats in lower 90s, with associated wheezing. He was given 1L IVF NS bolus, 1x duoneb, 4mg IV zofran in ED. EKG was unremarkable. CXR on my dry read had shown possibly e/o pneumonitis, at most, but no e/o PNA. Labs resulted with mild anemia, and lymphocytopenia which is c/w viral syndrome; RVP later resulted, positive for parainfluenza virus 3 which helps confirm that pt does have a viral bronchitis.    Pt reported feeling better and had no acute events while being monitored in ED. He appeared much more alert, and was ambulatory. I suspect that he had near syncopal episodes which seemed to be vasovagal from active coughing, and dehydration. These episodes also could have been r/t taking multiple meds at once. Highly doubt cardiac cause, and presentation does not appear to be c/w sz.     Pt was tolerating PO. Have Rx'd PRN zofran and advised him to continue with PO hydration and bland diet, as well as advising that he continue with his meds as prescribed while also taking mucinex and trying throat lozenges, and sipping honey/tea.  Pt and wife report that he has just refilled an albuterol MDI at home. We have given him a spacer device and I instructed him on how to use this with his MDI to maximize delivery of albuterol.     All studies performed, including EKG, labs and radiologic studies, were reviewed by me and explained to our pt and his wife. I informed them that our evaluation did not reveal e/o acute illness requiring admission, transfer, or further w/u in the ED. Pt expressed understanding of the working ddx, our proposed plan of care, and warning si/sx related to his present condition. He was amenable to being discharged with the proposed discharge plan.     Pt was instructed to f/u with his PCP Dr. Whipple, and to follow his discharge instructions. He was discharged in stable condition with strict return precautions, and was strongly advised to return to ED ASAP if his condition acutely worsens.    Everardo Cha M.D.  Emergency Medicine    Recent Vitals (last 24 hours):  Temperature [98.8 °F (37.1 °C)]   Blood Pressure (121-133)/(81-89)   MAP []   Heart Rate [71-83]   Respirations [20]   SpO2 [93 %-97 %]     No future appointments.    Prior to Admission medications   Medication Sig Start Date End Date Taking?   albuterol (PROVENTIL,VENTOLIN) 90 mcg/actuation inhaler Inhale 2 puffs into the lungs every 6 (six) hours as needed for Wheezing.   amLODIPine (NORVASC) 10 MG Tab tablet 3/11/21   atorvastatin (LIPITOR) 20 MG tablet Take 20 mg by mouth daily.   fluticasone-salmeterol (ADVAIR DISKUS) 250-50 mcg/dose diskus inhaler Inhale 1 puff into the lungs every 12 (twelve) hours. 12/5/17   indomethacin (INDOCIN) 50 MG Cap capsule Take 1 capsule (50 mg total) by mouth 3 (three) times daily with meals 4/6/21   multivitamin (THERAGRAN) per tablet Take 1 tablet by mouth daily.   ondansetron (ZOFRAN-ODT) 4 MG TbDi disintegrating tablet Take 1 tablet (4 mg total) by mouth every 8 (eight) hours as needed for Nausea (and vomiting) May take  up to 2 tablets (8 mg) at once per dose 4/23/23     ED Critical Care Time        Diagnosis:  1. Acute viral bronchitis   2. Acute bronchitis, unspecified organism   3. Nonspecific syndrome suggestive of viral illness   4. Lymphocytopenia   5. Infection due to parainfluenza virus 3   6. Acute viral syndrome   7. Near syncope   8. Normocytic anemia   9. Hyperglycemia     MD Semaj HAND Anoop, MD  04/24/23 0954    Electronically signed by Everardo Cha MD at 04/24/2023 9:54 AM CDT        Rupert was seen today for hospital follow up.    Diagnoses and all orders for this visit:    Infection due to parainfluenza virus 3    Parainfluenza virus bronchitis    Vasovagal syncope  -     Ambulatory referral/consult to Cardiology; Future    Severe obesity (BMI 35.0-39.9) with comorbidity    Recurrent syncope  -     Ambulatory referral/consult to Cardiology; Future      He will complete starts.  Can continue using the albuterol for now.  We will refer him to Cardiology due to recurrent vasovagal syncope            Past Medical History:  Past Medical History:   Diagnosis Date    Broken nose 12/2017    Pneumonia 12/2017    Primary hypercholesterolemia     Syncope, vasovagal 12/2017     Past Surgical History:   Procedure Laterality Date    bulging disks      TONSILLECTOMY      When i was a child approx 4yrs old     Review of patient's allergies indicates:   Allergen Reactions    No known drug allergies      Current Outpatient Medications on File Prior to Visit   Medication Sig Dispense Refill    albuterol (PROVENTIL) 2.5 mg /3 mL (0.083 %) nebulizer solution Take 3 mLs (2.5 mg total) by nebulization every 6 (six) hours as needed for Wheezing. Rescue 100 each 0    albuterol (VENTOLIN HFA) 90 mcg/actuation inhaler Inhale 2 puffs into the lungs every 6 (six) hours as needed for Wheezing. Rescue 18 g 0    amLODIPine (NORVASC) 10 MG tablet TAKE 1 TABLET(10 MG) BY MOUTH EVERY DAY 90 tablet 2    atorvastatin (LIPITOR) 40 MG tablet  "Take 1 tablet (40 mg total) by mouth once daily. Obtain a lipid and liver panel in 3 months. 90 tablet 3    methylPREDNISolone (MEDROL DOSEPACK) 4 mg tablet use as directed 1 each 0    multivitamin (THERAGRAN) per tablet Take 1 tablet by mouth.      [DISCONTINUED] albuterol (PROVENTIL/VENTOLIN HFA) 90 mcg/actuation inhaler INHALE 2 PUFFS INTO THE LUNGS EVERY 6 HOURS AS NEEDED FOR WHEEZING OR RESCUE 8.5 g 0    benzonatate (TESSALON) 200 MG capsule Take 1 capsule (200 mg total) by mouth 3 (three) times daily as needed. (Patient not taking: Reported on 4/28/2023) 30 capsule 0    ondansetron (ZOFRAN-ODT) 4 MG TbDL Take 4 mg by mouth.      [DISCONTINUED] azithromycin (Z-HILARIO) 250 MG tablet Take 2 tablets by mouth on day 1; Take 1 tablet by mouth on days 2-5 6 tablet 0     No current facility-administered medications on file prior to visit.     Social History     Socioeconomic History    Marital status:    Tobacco Use    Smoking status: Former    Smokeless tobacco: Former   Substance and Sexual Activity    Alcohol use: Yes     Alcohol/week: 5.0 standard drinks     Types: 2 Glasses of wine, 2 Cans of beer, 1 Standard drinks or equivalent per week     Comment: Maybe 2 drinks during the week.couple more on weekend    Drug use: Never    Sexual activity: Yes     Partners: Female     Birth control/protection: None     Family History   Problem Relation Age of Onset    Arthritis Mother     Arthritis Maternal Grandmother     Diabetes Paternal Uncle            ROS:  GENERAL: No fever, chills,  or significant weight changes.   CARDIOVASCULAR: Denies exertional chest pain, PND, orthopnea or reduced exercise tolerance.  ABDOMEN: Appetite fine. Denies diarrhea, abdominal pain, hematemesis or blood in stool.  URINARY: No flank pain, dysuria or hematuria.    Vitals:    04/28/23 1342   BP: 119/76   Pulse: 101   Temp: 97.7 °F (36.5 °C)   TempSrc: Temporal   SpO2: 97%   Weight: 105.1 kg (231 lb 11.2 oz)   Height: 5' 7" (1.702 m) "       Wt Readings from Last 3 Encounters:   04/28/23 105.1 kg (231 lb 11.2 oz)   08/29/22 104.3 kg (230 lb)   05/16/22 105.5 kg (232 lb 8 oz)       APPEARANCE: Well nourished, well developed, in no acute distress.    HEAD: Normocephalic.  Atraumatic.  Sinuses nontender  Ears:  Tympanic membranes intact.  No effusion or erythema.  Nose clear  Throat no erythema or exudate  EYES:   Right eye: Pupil reactive.  Conjunctiva clear.    Left eye: Pupil reactive.  Conjunctiva clear.    NECK: Supple. No bruits.  No JVD.  No cervical lymphadenopathy.  No thyromegaly.    CHEST: Breath sounds clear bilaterally.  Normal respiratory effort  CARDIOVASCULAR: Normal rate.  Regular rhythm.  No murmurs.  No rub.  No gallops.   No edema.  MENTAL STATUS: Alert.  Oriented x 3.

## 2023-05-01 DIAGNOSIS — Z76.89 ESTABLISHING CARE WITH NEW DOCTOR, ENCOUNTER FOR: Primary | ICD-10-CM

## 2023-05-02 ENCOUNTER — OFFICE VISIT (OUTPATIENT)
Dept: CARDIOLOGY | Facility: CLINIC | Age: 53
End: 2023-05-02
Payer: COMMERCIAL

## 2023-05-02 ENCOUNTER — HOSPITAL ENCOUNTER (OUTPATIENT)
Dept: CARDIOLOGY | Facility: HOSPITAL | Age: 53
Discharge: HOME | End: 2023-05-02
Attending: INTERNAL MEDICINE
Payer: COMMERCIAL

## 2023-05-02 VITALS
WEIGHT: 233.5 LBS | HEART RATE: 78 BPM | BODY MASS INDEX: 36.65 KG/M2 | DIASTOLIC BLOOD PRESSURE: 82 MMHG | OXYGEN SATURATION: 98 % | HEIGHT: 67 IN | SYSTOLIC BLOOD PRESSURE: 138 MMHG

## 2023-05-02 DIAGNOSIS — R05.4 COUGH SYNCOPE: ICD-10-CM

## 2023-05-02 DIAGNOSIS — R55 COUGH SYNCOPE: ICD-10-CM

## 2023-05-02 DIAGNOSIS — E78.2 MIXED HYPERLIPIDEMIA: ICD-10-CM

## 2023-05-02 DIAGNOSIS — Z76.89 ESTABLISHING CARE WITH NEW DOCTOR, ENCOUNTER FOR: ICD-10-CM

## 2023-05-02 DIAGNOSIS — E66.9 OBESITY (BMI 30-39.9): ICD-10-CM

## 2023-05-02 DIAGNOSIS — R55 RECURRENT SYNCOPE: ICD-10-CM

## 2023-05-02 DIAGNOSIS — I10 ESSENTIAL HYPERTENSION: Primary | ICD-10-CM

## 2023-05-02 DIAGNOSIS — R55 VASOVAGAL SYNCOPE: ICD-10-CM

## 2023-05-02 PROCEDURE — 93010 EKG 12-LEAD: ICD-10-PCS | Mod: ,,, | Performed by: STUDENT IN AN ORGANIZED HEALTH CARE EDUCATION/TRAINING PROGRAM

## 2023-05-02 PROCEDURE — 1160F RVW MEDS BY RX/DR IN RCRD: CPT | Mod: CPTII,S$GLB,, | Performed by: INTERNAL MEDICINE

## 2023-05-02 PROCEDURE — 1160F PR REVIEW ALL MEDS BY PRESCRIBER/CLIN PHARMACIST DOCUMENTED: ICD-10-PCS | Mod: CPTII,S$GLB,, | Performed by: INTERNAL MEDICINE

## 2023-05-02 PROCEDURE — 3008F PR BODY MASS INDEX (BMI) DOCUMENTED: ICD-10-PCS | Mod: CPTII,S$GLB,, | Performed by: INTERNAL MEDICINE

## 2023-05-02 PROCEDURE — 99204 OFFICE O/P NEW MOD 45 MIN: CPT | Mod: S$GLB,,, | Performed by: INTERNAL MEDICINE

## 2023-05-02 PROCEDURE — 3075F SYST BP GE 130 - 139MM HG: CPT | Mod: CPTII,S$GLB,, | Performed by: INTERNAL MEDICINE

## 2023-05-02 PROCEDURE — 93005 ELECTROCARDIOGRAM TRACING: CPT | Mod: PO

## 2023-05-02 PROCEDURE — 1159F PR MEDICATION LIST DOCUMENTED IN MEDICAL RECORD: ICD-10-PCS | Mod: CPTII,S$GLB,, | Performed by: INTERNAL MEDICINE

## 2023-05-02 PROCEDURE — 3079F DIAST BP 80-89 MM HG: CPT | Mod: CPTII,S$GLB,, | Performed by: INTERNAL MEDICINE

## 2023-05-02 PROCEDURE — 99999 PR PBB SHADOW E&M-EST. PATIENT-LVL III: ICD-10-PCS | Mod: PBBFAC,,, | Performed by: INTERNAL MEDICINE

## 2023-05-02 PROCEDURE — 3075F PR MOST RECENT SYSTOLIC BLOOD PRESS GE 130-139MM HG: ICD-10-PCS | Mod: CPTII,S$GLB,, | Performed by: INTERNAL MEDICINE

## 2023-05-02 PROCEDURE — 1159F MED LIST DOCD IN RCRD: CPT | Mod: CPTII,S$GLB,, | Performed by: INTERNAL MEDICINE

## 2023-05-02 PROCEDURE — 99999 PR PBB SHADOW E&M-EST. PATIENT-LVL III: CPT | Mod: PBBFAC,,, | Performed by: INTERNAL MEDICINE

## 2023-05-02 PROCEDURE — 3079F PR MOST RECENT DIASTOLIC BLOOD PRESSURE 80-89 MM HG: ICD-10-PCS | Mod: CPTII,S$GLB,, | Performed by: INTERNAL MEDICINE

## 2023-05-02 PROCEDURE — 93010 ELECTROCARDIOGRAM REPORT: CPT | Mod: ,,, | Performed by: STUDENT IN AN ORGANIZED HEALTH CARE EDUCATION/TRAINING PROGRAM

## 2023-05-02 PROCEDURE — 99204 PR OFFICE/OUTPT VISIT, NEW, LEVL IV, 45-59 MIN: ICD-10-PCS | Mod: S$GLB,,, | Performed by: INTERNAL MEDICINE

## 2023-05-02 PROCEDURE — 3008F BODY MASS INDEX DOCD: CPT | Mod: CPTII,S$GLB,, | Performed by: INTERNAL MEDICINE

## 2023-05-02 NOTE — PROGRESS NOTES
Subjective:   Patient ID:  Rupert Mir is a 52 y.o. male who presents for evaluation of Dizziness and Loss of Consciousness      HPI52 yo WM with HTN and HLD who had episode of syncope during a coughing spell. Has had this before. This time was sitting down and stated symptoms only lasted a few seconds. Denies chest pain, SOB, but gets mild ankle edema edema , EKG shows Q waves in inferior leads noted in previous EKG    Review of Systems   Constitutional: Negative for decreased appetite, fever, malaise/fatigue, weight gain and weight loss.   HENT:  Negative for hearing loss and nosebleeds.    Eyes:  Negative for visual disturbance.   Cardiovascular:  Positive for leg swelling, near-syncope and syncope. Negative for chest pain, claudication, cyanosis, dyspnea on exertion, irregular heartbeat, orthopnea, palpitations and paroxysmal nocturnal dyspnea.   Respiratory:  Negative for cough, hemoptysis, shortness of breath, sleep disturbances due to breathing, snoring and wheezing.    Endocrine: Negative for cold intolerance, heat intolerance, polydipsia and polyuria.   Hematologic/Lymphatic: Negative for adenopathy and bleeding problem. Does not bruise/bleed easily.   Skin:  Negative for color change, itching, poor wound healing, rash and suspicious lesions.   Musculoskeletal:  Negative for arthritis, back pain, falls, joint pain, joint swelling, muscle cramps, muscle weakness and myalgias.   Gastrointestinal:  Negative for bloating, abdominal pain, change in bowel habit, constipation, flatus, heartburn, hematemesis, hematochezia, hemorrhoids, jaundice, melena, nausea and vomiting.   Genitourinary:  Negative for bladder incontinence, decreased libido, frequency, hematuria, hesitancy and urgency.   Neurological:  Negative for brief paralysis, difficulty with concentration, excessive daytime sleepiness, dizziness, focal weakness, headaches, light-headedness, loss of balance, numbness, vertigo and weakness.  "  Psychiatric/Behavioral:  Negative for altered mental status, depression and memory loss. The patient does not have insomnia and is not nervous/anxious.    Allergic/Immunologic: Negative for environmental allergies, hives and persistent infections.     Objective:   Physical Exam  Constitutional:       General: He is not in acute distress.     Appearance: He is well-developed. He is obese. He is not diaphoretic.      Comments: /82   Pulse 78   Ht 5' 7" (1.702 m)   Wt 105.9 kg (233 lb 8 oz)   SpO2 98%   BMI 36.57 kg/m²      HENT:      Head: Normocephalic and atraumatic.   Eyes:      General: Lids are normal. No scleral icterus.        Right eye: No discharge.      Conjunctiva/sclera: Conjunctivae normal.      Pupils: Pupils are equal, round, and reactive to light.   Neck:      Thyroid: No thyromegaly.      Vascular: No JVD.      Trachea: No tracheal deviation.   Cardiovascular:      Rate and Rhythm: Normal rate and regular rhythm.      Pulses: Intact distal pulses.           Carotid pulses are 2+ on the right side and 2+ on the left side.       Radial pulses are 2+ on the right side and 2+ on the left side.        Femoral pulses are 2+ on the right side and 2+ on the left side.       Popliteal pulses are 2+ on the right side and 2+ on the left side.        Dorsalis pedis pulses are 2+ on the right side and 2+ on the left side.        Posterior tibial pulses are 2+ on the right side and 2+ on the left side.      Heart sounds: Normal heart sounds, S1 normal and S2 normal. No murmur heard.    No friction rub. No gallop.   Pulmonary:      Effort: Pulmonary effort is normal. No respiratory distress.      Breath sounds: Normal breath sounds. No wheezing or rales.   Chest:      Chest wall: No tenderness.   Abdominal:      General: Bowel sounds are normal. There is no distension.      Palpations: Abdomen is soft. There is no hepatomegaly or mass.      Tenderness: There is no abdominal tenderness. There is no " guarding or rebound.   Musculoskeletal:         General: No tenderness. Normal range of motion.      Cervical back: Normal range of motion and neck supple.   Lymphadenopathy:      Cervical: No cervical adenopathy.   Skin:     General: Skin is warm and dry.      Coloration: Skin is not pale.      Findings: No erythema or rash.   Neurological:      Mental Status: He is alert and oriented to person, place, and time.      Cranial Nerves: No cranial nerve deficit.      Coordination: Coordination normal.      Deep Tendon Reflexes: Reflexes are normal and symmetric.   Psychiatric:         Speech: Speech normal.         Behavior: Behavior normal.         Thought Content: Thought content normal.         Judgment: Judgment normal.       Assessment:      1. Essential hypertension    2. Vasovagal syncope    3. Recurrent syncope    4. Mixed hyperlipidemia    5. Obesity (BMI 30-39.9)    6. Cough syncope        Plan:   Probable cough syncope   Because of EKG findings, HTN, HLD and age will check stress test and echo     Orders Placed This Encounter   Procedures    Exercise Stress - EKG    Echo     Follow up in about 6 months (around 11/2/2023).

## 2023-05-05 ENCOUNTER — TELEPHONE (OUTPATIENT)
Dept: CARDIOLOGY | Facility: CLINIC | Age: 53
End: 2023-05-05
Payer: COMMERCIAL

## 2023-05-05 NOTE — TELEPHONE ENCOUNTER
Kate SESAY [facility] Proc Clinical Support  Caller: Mrs. Mir/ Wife (Today,  3:59 PM)  Patient wife is calling regarding concerns regarding stress test. Please give patient wife a call back at [phone].       I returned call . No answer. LVM for pt or spouse to call back.

## 2023-05-15 ENCOUNTER — HOSPITAL ENCOUNTER (OUTPATIENT)
Dept: CARDIOLOGY | Facility: HOSPITAL | Age: 53
Discharge: HOME | End: 2023-05-15
Attending: INTERNAL MEDICINE
Payer: COMMERCIAL

## 2023-05-15 VITALS
BODY MASS INDEX: 36.57 KG/M2 | HEART RATE: 71 BPM | WEIGHT: 233 LBS | DIASTOLIC BLOOD PRESSURE: 82 MMHG | HEIGHT: 67 IN | SYSTOLIC BLOOD PRESSURE: 138 MMHG

## 2023-05-15 DIAGNOSIS — R55 VASOVAGAL SYNCOPE: ICD-10-CM

## 2023-05-15 DIAGNOSIS — I10 ESSENTIAL HYPERTENSION: ICD-10-CM

## 2023-05-15 LAB
AORTIC ROOT ANNULUS: 3.86 CM
ASCENDING AORTA: 2.88 CM
AV INDEX (PROSTH): 0.76
AV MEAN GRADIENT: 3 MMHG
AV PEAK GRADIENT: 6 MMHG
AV VALVE AREA: 2.22 CM2
AV VELOCITY RATIO: 0.71
BSA FOR ECHO PROCEDURE: 2.24 M2
CV ECHO LV RWT: 0.68 CM
DOP CALC AO PEAK VEL: 1.24 M/S
DOP CALC AO VTI: 24.4 CM
DOP CALC LVOT AREA: 2.9 CM2
DOP CALC LVOT DIAMETER: 1.93 CM
DOP CALC LVOT PEAK VEL: 0.88 M/S
DOP CALC LVOT STROKE VOLUME: 54.09 CM3
DOP CALC RVOT PEAK VEL: 0.66 M/S
DOP CALC RVOT VTI: 15.5 CM
DOP CALCLVOT PEAK VEL VTI: 18.5 CM
E WAVE DECELERATION TIME: 216.31 MSEC
E/A RATIO: 0.72
E/E' RATIO: 6.44 M/S
ECHO LV POSTERIOR WALL: 1.4 CM (ref 0.6–1.1)
EJECTION FRACTION: 65 %
FRACTIONAL SHORTENING: 35 % (ref 28–44)
INTERVENTRICULAR SEPTUM: 1.32 CM (ref 0.6–1.1)
IVC DIAMETER: 1.01 CM
IVRT: 85.63 MSEC
LA MAJOR: 4.94 CM
LA MINOR: 5.29 CM
LA WIDTH: 3.9 CM
LEFT ATRIUM SIZE: 3.47 CM
LEFT ATRIUM VOLUME INDEX MOD: 22.2 ML/M2
LEFT ATRIUM VOLUME INDEX: 27.2 ML/M2
LEFT ATRIUM VOLUME MOD: 47.85 CM3
LEFT ATRIUM VOLUME: 58.77 CM3
LEFT INTERNAL DIMENSION IN SYSTOLE: 2.67 CM (ref 2.1–4)
LEFT VENTRICLE DIASTOLIC VOLUME INDEX: 34.86 ML/M2
LEFT VENTRICLE DIASTOLIC VOLUME: 75.3 ML
LEFT VENTRICLE MASS INDEX: 97 G/M2
LEFT VENTRICLE SYSTOLIC VOLUME INDEX: 12.1 ML/M2
LEFT VENTRICLE SYSTOLIC VOLUME: 26.23 ML
LEFT VENTRICULAR INTERNAL DIMENSION IN DIASTOLE: 4.13 CM (ref 3.5–6)
LEFT VENTRICULAR MASS: 209.41 G
LV LATERAL E/E' RATIO: 5.27 M/S
LV SEPTAL E/E' RATIO: 8.29 M/S
LVOT MG: 1.81 MMHG
LVOT MV: 0.63 CM/S
MV PEAK A VEL: 0.81 M/S
MV PEAK E VEL: 0.58 M/S
MV STENOSIS PRESSURE HALF TIME: 62.73 MS
MV VALVE AREA P 1/2 METHOD: 3.51 CM2
PISA TR MAX VEL: 2.11 M/S
PULM VEIN S/D RATIO: 1.71
PV MEAN GRADIENT: 0.88 MMHG
PV PEAK D VEL: 0.34 M/S
PV PEAK S VEL: 0.58 M/S
PV PEAK VELOCITY: 0.83 CM/S
RA MAJOR: 4.35 CM
RA PRESSURE: 3 MMHG
RIGHT VENTRICULAR END-DIASTOLIC DIMENSION: 2.85 CM
STJ: 3.07 CM
TDI LATERAL: 0.11 M/S
TDI SEPTAL: 0.07 M/S
TDI: 0.09 M/S
TR MAX PG: 18 MMHG
TRICUSPID ANNULAR PLANE SYSTOLIC EXCURSION: 2.2 CM
TV REST PULMONARY ARTERY PRESSURE: 21 MMHG

## 2023-05-15 PROCEDURE — 93306 TTE W/DOPPLER COMPLETE: CPT | Mod: PO

## 2023-05-15 PROCEDURE — 93306 ECHO (CUPID ONLY): ICD-10-PCS | Mod: 26,,, | Performed by: INTERNAL MEDICINE

## 2023-05-15 PROCEDURE — 93306 TTE W/DOPPLER COMPLETE: CPT | Mod: 26,,, | Performed by: INTERNAL MEDICINE

## 2023-05-16 ENCOUNTER — PATIENT MESSAGE (OUTPATIENT)
Dept: ADMINISTRATIVE | Facility: HOSPITAL | Age: 53
End: 2023-05-16
Payer: COMMERCIAL

## 2023-05-17 ENCOUNTER — PATIENT MESSAGE (OUTPATIENT)
Dept: CARDIOLOGY | Facility: CLINIC | Age: 53
End: 2023-05-17

## 2023-05-17 ENCOUNTER — HOSPITAL ENCOUNTER (OUTPATIENT)
Dept: CARDIOLOGY | Facility: HOSPITAL | Age: 53
Discharge: HOME | End: 2023-05-17
Attending: INTERNAL MEDICINE
Payer: COMMERCIAL

## 2023-05-17 DIAGNOSIS — R55 VASOVAGAL SYNCOPE: ICD-10-CM

## 2023-05-17 DIAGNOSIS — I10 ESSENTIAL HYPERTENSION: ICD-10-CM

## 2023-05-17 LAB
CV STRESS BASE HR: 74 BPM
DIASTOLIC BLOOD PRESSURE: 85 MMHG
OHS CV CPX 1 MINUTE RECOVERY HEART RATE: 122 BPM
OHS CV CPX 85 PERCENT MAX PREDICTED HEART RATE MALE: 141
OHS CV CPX ESTIMATED METS: 10
OHS CV CPX MAX PREDICTED HEART RATE: 166
OHS CV CPX PATIENT IS FEMALE: 0
OHS CV CPX PATIENT IS MALE: 1
OHS CV CPX PEAK DIASTOLIC BLOOD PRESSURE: 71 MMHG
OHS CV CPX PEAK HEAR RATE: 144 BPM
OHS CV CPX PEAK RATE PRESSURE PRODUCT: NORMAL
OHS CV CPX PEAK SYSTOLIC BLOOD PRESSURE: 213 MMHG
OHS CV CPX PERCENT MAX PREDICTED HEART RATE ACHIEVED: 87
OHS CV CPX RATE PRESSURE PRODUCT PRESENTING: 9990
STRESS ECHO POST EXERCISE DUR MIN: 8 MINUTES
STRESS ECHO POST EXERCISE DUR SEC: 16 SECONDS
SYSTOLIC BLOOD PRESSURE: 135 MMHG

## 2023-05-17 PROCEDURE — 93016 EXERCISE STRESS - EKG (CUPID ONLY): ICD-10-PCS | Mod: ,,, | Performed by: INTERNAL MEDICINE

## 2023-05-17 PROCEDURE — 93017 CV STRESS TEST TRACING ONLY: CPT | Mod: PO

## 2023-05-17 PROCEDURE — 93018 EXERCISE STRESS - EKG (CUPID ONLY): ICD-10-PCS | Mod: ,,, | Performed by: INTERNAL MEDICINE

## 2023-05-17 PROCEDURE — 93016 CV STRESS TEST SUPVJ ONLY: CPT | Mod: ,,, | Performed by: INTERNAL MEDICINE

## 2023-05-17 PROCEDURE — 93018 CV STRESS TEST I&R ONLY: CPT | Mod: ,,, | Performed by: INTERNAL MEDICINE

## 2023-06-11 NOTE — TELEPHONE ENCOUNTER
No care due was identified.  [facility] Embedded Care Due Messages. Reference number: [id number].   6/11/2023 6:44:35 AM CDT

## 2023-06-11 NOTE — TELEPHONE ENCOUNTER
Refill Routing Note   Medication(s) are not appropriate for processing by [facility] for the following reason(s):      New or recently adjusted medication  No active prescription written by PCP    [facility] action(s):  Defer None identified          Appointments  past 12m or future 3m with PCP    Date Provider   Last Visit   8/29/2022 Lex Whipple MD   Next Visit   Visit date not found Lex Whipple MD   ED visits in past 90 days: 0        Note composed:12:12 PM 06/11/2023

## 2023-06-12 RX ORDER — ALBUTEROL SULFATE 90 UG/1
AEROSOL, METERED RESPIRATORY (INHALATION)
Qty: 8.5 G | Refills: 11 | Status: SHIPPED | OUTPATIENT
Start: 2023-06-12

## 2023-10-05 DIAGNOSIS — I10 ESSENTIAL HYPERTENSION: ICD-10-CM

## 2023-10-05 RX ORDER — AMLODIPINE BESYLATE 10 MG/1
TABLET ORAL
Qty: 90 TABLET | Refills: 0 | Status: SHIPPED | OUTPATIENT
Start: 2023-10-05 | End: 2024-01-05

## 2023-10-05 NOTE — TELEPHONE ENCOUNTER
No care due was identified.  [facility] Embedded Care Due Messages. Reference number: [id number].   10/05/2023 3:52:34 AM CDT

## 2023-10-05 NOTE — TELEPHONE ENCOUNTER
Refill Decision Note   Rupert Mir  is requesting a refill authorization.  Brief Assessment and Rationale for Refill:  Approve     Medication Therapy Plan:         Comments:     Note composed:8:14 AM 10/05/2023             Appointments     Last Visit   8/29/2022 Lex Whipple MD   Next Visit   Visit date not found Lex Whipple MD
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
## 2023-11-27 RX ORDER — MUPIROCIN 20 MG/G
OINTMENT TOPICAL 3 TIMES DAILY
Qty: 22 G | Refills: 0 | Status: SHIPPED | OUTPATIENT
Start: 2023-11-27

## 2023-11-27 RX ORDER — AMOXICILLIN AND CLAVULANATE POTASSIUM 875; 125 MG/1; MG/1
1 TABLET, FILM COATED ORAL EVERY 12 HOURS
Qty: 20 TABLET | Refills: 0 | Status: SHIPPED | OUTPATIENT
Start: 2023-11-27 | End: 2023-12-07

## 2023-12-26 ENCOUNTER — OFFICE VISIT (OUTPATIENT)
Dept: FAMILY MEDICINE | Facility: CLINIC | Age: 53
End: 2023-12-26
Payer: COMMERCIAL

## 2023-12-26 ENCOUNTER — PATIENT MESSAGE (OUTPATIENT)
Dept: FAMILY MEDICINE | Facility: CLINIC | Age: 53
End: 2023-12-26

## 2023-12-26 ENCOUNTER — LAB VISIT (OUTPATIENT)
Dept: LAB | Facility: HOSPITAL | Age: 53
End: 2023-12-26
Attending: FAMILY MEDICINE
Payer: COMMERCIAL

## 2023-12-26 DIAGNOSIS — R10.9 ACUTE LEFT FLANK PAIN: ICD-10-CM

## 2023-12-26 DIAGNOSIS — R10.12 LEFT UPPER QUADRANT ABDOMINAL PAIN: Primary | ICD-10-CM

## 2023-12-26 LAB
ANION GAP SERPL CALC-SCNC: 6 MMOL/L (ref 8–16)
BACTERIA #/AREA URNS HPF: NORMAL /HPF
BILIRUB UR QL STRIP: NEGATIVE
BUN SERPL-MCNC: 20 MG/DL (ref 6–20)
CALCIUM SERPL-MCNC: 9.1 MG/DL (ref 8.7–10.5)
CHLORIDE SERPL-SCNC: 105 MMOL/L (ref 95–110)
CLARITY UR: CLEAR
CO2 SERPL-SCNC: 31 MMOL/L (ref 23–29)
COLOR UR: YELLOW
CREAT SERPL-MCNC: 1.1 MG/DL (ref 0.5–1.4)
EST. GFR  (NO RACE VARIABLE): >60 ML/MIN/1.73 M^2
GLUCOSE SERPL-MCNC: 138 MG/DL (ref 70–110)
GLUCOSE UR QL STRIP: NEGATIVE
HGB UR QL STRIP: ABNORMAL
HYALINE CASTS #/AREA URNS LPF: 0 /LPF
KETONES UR QL STRIP: NEGATIVE
LEUKOCYTE ESTERASE UR QL STRIP: NEGATIVE
MICROSCOPIC COMMENT: NORMAL
NITRITE UR QL STRIP: NEGATIVE
PH UR STRIP: 6 [PH] (ref 5–8)
POTASSIUM SERPL-SCNC: 4.4 MMOL/L (ref 3.5–5.1)
PROT UR QL STRIP: ABNORMAL
RBC #/AREA URNS HPF: 0 /HPF (ref 0–4)
SODIUM SERPL-SCNC: 142 MMOL/L (ref 136–145)
SP GR UR STRIP: 1.02 (ref 1–1.03)
SQUAMOUS #/AREA URNS HPF: NORMAL /HPF
URN SPEC COLLECT METH UR: ABNORMAL
WBC #/AREA URNS HPF: 0 /HPF (ref 0–5)

## 2023-12-26 PROCEDURE — 99213 PR OFFICE/OUTPT VISIT, EST, LEVL III, 20-29 MIN: ICD-10-PCS | Mod: 95,,, | Performed by: FAMILY MEDICINE

## 2023-12-26 PROCEDURE — 1160F RVW MEDS BY RX/DR IN RCRD: CPT | Mod: CPTII,95,, | Performed by: FAMILY MEDICINE

## 2023-12-26 PROCEDURE — 80048 BASIC METABOLIC PNL TOTAL CA: CPT | Performed by: FAMILY MEDICINE

## 2023-12-26 PROCEDURE — 1159F MED LIST DOCD IN RCRD: CPT | Mod: CPTII,95,, | Performed by: FAMILY MEDICINE

## 2023-12-26 PROCEDURE — 1160F PR REVIEW ALL MEDS BY PRESCRIBER/CLIN PHARMACIST DOCUMENTED: ICD-10-PCS | Mod: CPTII,95,, | Performed by: FAMILY MEDICINE

## 2023-12-26 PROCEDURE — 81000 URINALYSIS NONAUTO W/SCOPE: CPT | Mod: PO | Performed by: FAMILY MEDICINE

## 2023-12-26 PROCEDURE — 36415 COLL VENOUS BLD VENIPUNCTURE: CPT | Mod: PO | Performed by: FAMILY MEDICINE

## 2023-12-26 PROCEDURE — 1159F PR MEDICATION LIST DOCUMENTED IN MEDICAL RECORD: ICD-10-PCS | Mod: CPTII,95,, | Performed by: FAMILY MEDICINE

## 2023-12-26 PROCEDURE — 99213 OFFICE O/P EST LOW 20 MIN: CPT | Mod: 95,,, | Performed by: FAMILY MEDICINE

## 2023-12-26 NOTE — PROGRESS NOTES
The patient location is: Home  The chief complaint leading to consultation is:Flank pain  Visit type: Virtual visit with synchronous audio and video  Total time spent with patient: 15 minutes  Each patient to whom he or she provides medical services by telemedicine is:  (1) informed of the relationship between the physician and patient and the respective role of any other health care provider with respect to management of the patient; and (2) notified that he or she may decline to receive medical services by telemedicine and may withdraw from such care at any time.    Notes:   IMP: Flank pain favor ureterolithiasis vs diverticulitis  PLAN: CT stone protocol UA BMP  Rupert SESAY Adeola presents with moderate lt flank pain x4-5 days by Saturday mod severe (8) now improved. Was constipated w small BM Saturday, normal yesterday. Denies nausea,vomiting,diarrhea or fever.    Past Medical History:   Diagnosis Date    Broken nose 12/2017    Pneumonia 12/2017    Primary hypercholesterolemia     Syncope, vasovagal 12/2017     Past Surgical History:   Procedure Laterality Date    bulging disks      TONSILLECTOMY      When i was a child approx 4yrs old     Review of patient's allergies indicates:   Allergen Reactions    No known drug allergies      Current Outpatient Medications on File Prior to Visit   Medication Sig Dispense Refill    albuterol (PROVENTIL/VENTOLIN HFA) 90 mcg/actuation inhaler INHALE 2 PUFFS INTO THE LUNGS EVERY 6 HOURS AS NEEDED FOR WHEEZING OR RESCUE 8.5 g 11    albuterol (PROVENTIL) 2.5 mg /3 mL (0.083 %) nebulizer solution Take 3 mLs (2.5 mg total) by nebulization every 6 (six) hours as needed for Wheezing. Rescue 100 each 0    amLODIPine (NORVASC) 10 MG tablet TAKE 1 TABLET(10 MG) BY MOUTH EVERY DAY 90 tablet 0    atorvastatin (LIPITOR) 40 MG tablet Take 1 tablet (40 mg total) by mouth once daily. Obtain a lipid and liver panel in 3 months. 90 tablet 3    multivitamin (THERAGRAN) per tablet Take 1  tablet by mouth.      mupirocin (BACTROBAN) 2 % ointment Apply topically 3 (three) times daily. 22 g 0    ondansetron (ZOFRAN-ODT) 4 MG TbDL Take 4 mg by mouth.       No current facility-administered medications on file prior to visit.     Social History     Socioeconomic History    Marital status:    Tobacco Use    Smoking status: Former    Smokeless tobacco: Former   Substance and Sexual Activity    Alcohol use: Yes     Alcohol/week: 5.0 standard drinks of alcohol     Types: 2 Glasses of wine, 2 Cans of beer, 1 Standard drinks or equivalent per week     Comment: Maybe 2 drinks during the week.couple more on weekend    Drug use: Never    Sexual activity: Yes     Partners: Female     Birth control/protection: None     Social Determinants of Health     Financial Resource Strain: Low Risk  (12/26/2023)    Overall Financial Resource Strain (CARDIA)     Difficulty of Paying Living Expenses: Not hard at all   Food Insecurity: No Food Insecurity (12/26/2023)    Hunger Vital Sign     Worried About Running Out of Food in the Last Year: Never true     Ran Out of Food in the Last Year: Never true   Transportation Needs: No Transportation Needs (12/26/2023)    PRAPARE - Transportation     Lack of Transportation (Medical): No     Lack of Transportation (Non-Medical): No   Physical Activity: Insufficiently Active (12/26/2023)    Exercise Vital Sign     Days of Exercise per Week: 3 days     Minutes of Exercise per Session: 40 min   Stress: No Stress Concern Present (12/26/2023)    [place] citizen [place] of Occupational Health - Occupational Stress Questionnaire     Feeling of Stress : Not at all   Social Connections: Unknown (12/26/2023)    Social Connection and Isolation Panel [NHANES]     Frequency of Communication with Friends and Family: More than three times a week     Frequency of Social Gatherings with Friends and Family: Once a week     Active Member of Clubs or Organizations: Yes     Attends Club or Organization  Meetings: More than 4 times per year     Marital Status:    Housing Stability: Unknown (12/26/2023)    Housing Stability Vital Sign     Unable to Pay for Housing in the Last Year: No     Unstable Housing in the Last Year: No     Family History   Problem Relation Age of Onset    Arthritis Mother     Arthritis Maternal Grandmother     Diabetes Paternal Uncle          ROS:  SKIN: No rashes, itching or changes in color or texture of skin.  CHEST: Denies MARINO, cyanosis, wheezing  CARDIOVASCULAR: Denies chest pain, PND, orthopnea or reduced exercise tolerance.  ABDOMEN:  No weight loss.No abdominal pain, no hematemesis or blood in stool.  URINARY: Positive left flank pain, no dysuria or hematuria.  PERIPHERAL VASCULAR: No claudication or cyanosis.    PE: Heent:Normocephalic conjunctiva clear,Nasal mucosa clear  Chest:No tachypnea. No wheezing, rhonchi on forced expiration  Abdomen:Soft, non tender to patient palpation    Answers submitted by the patient for this visit:  Back Pain Questionnaire (Submitted on 12/26/2023)  Chief Complaint: Back pain  Chronicity: new  Onset: in the past 7 days  Frequency: 2 to 4 times per day  Progression since onset: waxing and waning  Pain location: lumbar spine, costovertebral angle  Pain quality: shooting, stabbing  Radiates to: does not radiate  Pain - numeric: 7/10  Pain is: the same all the time  Aggravated by: bending, position  Stiffness is present: all day  abdominal pain: Yes  bladder incontinence: No  bowel incontinence: No  chest pain: No  dysuria: No  fever: No  headaches: No  leg pain: No  numbness: No  paresis: No  paresthesias: No  pelvic pain: No  perianal numbness: No  tingling: No  weakness: No  weight loss: No  genital pain: No  hematuria: No  Risk factors: obesity  Pain severity: moderate  Improvement on treatment: mild

## 2023-12-27 ENCOUNTER — TELEPHONE (OUTPATIENT)
Dept: FAMILY MEDICINE | Facility: CLINIC | Age: 53
End: 2023-12-27
Payer: COMMERCIAL

## 2023-12-27 ENCOUNTER — OFFICE VISIT (OUTPATIENT)
Dept: FAMILY MEDICINE | Facility: CLINIC | Age: 53
End: 2023-12-27
Payer: COMMERCIAL

## 2023-12-27 VITALS
HEIGHT: 67 IN | SYSTOLIC BLOOD PRESSURE: 135 MMHG | HEART RATE: 77 BPM | WEIGHT: 238.13 LBS | DIASTOLIC BLOOD PRESSURE: 83 MMHG | TEMPERATURE: 97 F | BODY MASS INDEX: 37.37 KG/M2

## 2023-12-27 DIAGNOSIS — E78.2 MIXED HYPERLIPIDEMIA: ICD-10-CM

## 2023-12-27 DIAGNOSIS — Z12.5 SCREENING FOR PROSTATE CANCER: ICD-10-CM

## 2023-12-27 DIAGNOSIS — Z00.00 ROUTINE HISTORY AND PHYSICAL EXAMINATION OF ADULT: ICD-10-CM

## 2023-12-27 DIAGNOSIS — I10 ESSENTIAL HYPERTENSION: ICD-10-CM

## 2023-12-27 DIAGNOSIS — R73.9 HYPERGLYCEMIA: ICD-10-CM

## 2023-12-27 DIAGNOSIS — R82.90 ABNORMAL URINALYSIS: ICD-10-CM

## 2023-12-27 DIAGNOSIS — R10.9 ACUTE LEFT FLANK PAIN: Primary | ICD-10-CM

## 2023-12-27 DIAGNOSIS — Z12.11 SCREENING FOR COLON CANCER: ICD-10-CM

## 2023-12-27 PROCEDURE — 3075F SYST BP GE 130 - 139MM HG: CPT | Mod: CPTII,S$GLB,, | Performed by: FAMILY MEDICINE

## 2023-12-27 PROCEDURE — 3075F PR MOST RECENT SYSTOLIC BLOOD PRESS GE 130-139MM HG: ICD-10-PCS | Mod: CPTII,S$GLB,, | Performed by: FAMILY MEDICINE

## 2023-12-27 PROCEDURE — 3008F BODY MASS INDEX DOCD: CPT | Mod: CPTII,S$GLB,, | Performed by: FAMILY MEDICINE

## 2023-12-27 PROCEDURE — 3008F PR BODY MASS INDEX (BMI) DOCUMENTED: ICD-10-PCS | Mod: CPTII,S$GLB,, | Performed by: FAMILY MEDICINE

## 2023-12-27 PROCEDURE — 99999 PR PBB SHADOW E&M-EST. PATIENT-LVL IV: CPT | Mod: PBBFAC,,, | Performed by: FAMILY MEDICINE

## 2023-12-27 PROCEDURE — 90471 FLU VACCINE (QUAD) GREATER THAN OR EQUAL TO 3YO PRESERVATIVE FREE IM: ICD-10-PCS | Mod: S$GLB,,, | Performed by: FAMILY MEDICINE

## 2023-12-27 PROCEDURE — 99396 PR PREVENTIVE VISIT,EST,40-64: ICD-10-PCS | Mod: 25,S$GLB,, | Performed by: FAMILY MEDICINE

## 2023-12-27 PROCEDURE — 99999 PR PBB SHADOW E&M-EST. PATIENT-LVL IV: ICD-10-PCS | Mod: PBBFAC,,, | Performed by: FAMILY MEDICINE

## 2023-12-27 PROCEDURE — 90686 FLU VACCINE (QUAD) GREATER THAN OR EQUAL TO 3YO PRESERVATIVE FREE IM: ICD-10-PCS | Mod: S$GLB,,, | Performed by: FAMILY MEDICINE

## 2023-12-27 PROCEDURE — 1159F MED LIST DOCD IN RCRD: CPT | Mod: CPTII,S$GLB,, | Performed by: FAMILY MEDICINE

## 2023-12-27 PROCEDURE — 1159F PR MEDICATION LIST DOCUMENTED IN MEDICAL RECORD: ICD-10-PCS | Mod: CPTII,S$GLB,, | Performed by: FAMILY MEDICINE

## 2023-12-27 PROCEDURE — 3079F PR MOST RECENT DIASTOLIC BLOOD PRESSURE 80-89 MM HG: ICD-10-PCS | Mod: CPTII,S$GLB,, | Performed by: FAMILY MEDICINE

## 2023-12-27 PROCEDURE — 99396 PREV VISIT EST AGE 40-64: CPT | Mod: 25,S$GLB,, | Performed by: FAMILY MEDICINE

## 2023-12-27 PROCEDURE — 90471 IMMUNIZATION ADMIN: CPT | Mod: S$GLB,,, | Performed by: FAMILY MEDICINE

## 2023-12-27 PROCEDURE — 90686 IIV4 VACC NO PRSV 0.5 ML IM: CPT | Mod: S$GLB,,, | Performed by: FAMILY MEDICINE

## 2023-12-27 PROCEDURE — 99213 OFFICE O/P EST LOW 20 MIN: CPT | Mod: 25,S$GLB,, | Performed by: FAMILY MEDICINE

## 2023-12-27 PROCEDURE — 99213 PR OFFICE/OUTPT VISIT, EST, LEVL III, 20-29 MIN: ICD-10-PCS | Mod: 25,S$GLB,, | Performed by: FAMILY MEDICINE

## 2023-12-27 PROCEDURE — 3079F DIAST BP 80-89 MM HG: CPT | Mod: CPTII,S$GLB,, | Performed by: FAMILY MEDICINE

## 2023-12-27 NOTE — TELEPHONE ENCOUNTER
Thomas,  patient you saw yesterday.  I had to sign order in order for [place] to schedule the CT.  Here is the result.  I will let you review with patient.  Thanks    CT Abdomen Pelvis WO Contrast Stone  Order: [id number]  Status: Final result       Visible to patient: Yes (seen)       Dx: Epigastric pain    0 Result Notes  Details    Reading Physician Reading Date Result Priority   Ariel Blake MD  [phone] 12/26/2023      Narrative & Impression  EXAM: CT ABDOMEN PELVIS WO CONTRAST STONE     CLINICAL HISTORY:  R10.13 [R10.13]-Epigastric pain.     COMPARISON STUDIES: None     TECHNIQUE: CT scan performed of the abdomen and pelvis without IV contrast.  Oral contrast wasn't administered.     FINDINGS:        Lung Bases:  Small subpleural 4 mm left lower lobe pulmonary nodule.  Advanced coronary arterial calcifications.     Liver:  No contour deformities.     Spleen:  No contour deformities.     Pancreas:  No contour deformities.     Adrenals:  The adrenals appear normal.     Kidneys:  No contour deformities. No calculi or hydronephrosis.     Gallbladder:  The gallbladder appears normal with no opaque gallstones evident.     Bowel: The bowel appears unremarkable.  Negative for obstruction.  There are no surrounding inflammatory changes or fluid collections.  Normal appendix.     Retroperitoneum:  Unremarkable.     Abdominal Wall:  Unremarkable.     Bladder:  The bladder appears normal.      System:  Normal prostate.     Bones:  There are no acute osseous findings.  Age appropriate or no arthritic change.     Vasculature:  Negative for aortic aneurysm.  No significant atherosclerosis.        IMPRESSION:  No acute findings.     Incidental findings include tiny 4 mm left lower lobe pulmonary nodule.  See below chart.              Fleischner Society 2017 Guidelines - http://bit.ly/fleischner     Solid Nodules - Single     Low risk  Less than 6 mm - No routine follow-up  6-8 mm - CT at 6-12 months, then consider  CT at 18-24 months  Greater than 8 mm - Consider CT at 3 months, PET/CT, or tissue sampling     Comments:  Nodules less than 6 mm do not require routine follow-up in low -risk patients     High risk  Less than 6 mm - Optional CT at 12 months  6-8 mm - CT at 6-12 months, then CT at 18-24 months  Greater than 8 mm - Consider CT at 3 months, PET/CT, or tissue sampling     Comments:  Certain patients at high risk with suspicious nodule morphology, upper lobe location, or both may warrant 12-month follow-up                    All CT scans at [this location] are performed using dose modulation techniques as appropriate to a performed exam including the following: automated exposure control; adjustment of the mA and/or kV according to patient size (this includes techniques or   standardized protocols for targeted exams where dose is matched to indication / reason for exam; i.e. extremities or head); use of iterative reconstruction technique.     Finalized on: 12/26/2023 3:00 PM By:  Ariel WADE# [id number]      2023-12-26 15:02:45.971    SHERI

## 2023-12-27 NOTE — PROGRESS NOTES
Patient had flank pain started approximately week ago at the left side.  Had some radiation towards the left abdomen.  Throbbing sharp intermittent pain.  He denied any nausea vomiting diarrhea or fever.  Had some constipation but since had bowel movement.  Denied any rash or heartburn.  He states symptoms resolved as of past 3 days.  He had a video visit with Dr. Flores yesterday and had CT abdomen pelvis as well as urinalysis ordered.  He had a 4 mm pulmonary nodule-low risk.  He had small amount protein in the urine, but no hematuria.  In addition he is due for his physical exam and laboratory.  He is hypertension and hyperlipidemia currently treated, stable.        Rupert was seen today for abdominal pain.    Diagnoses and all orders for this visit:    Acute left flank pain    Routine history and physical examination of adult  -     Cancel: Comprehensive Metabolic Panel; Future  -     Cancel: Hemoglobin A1C; Future  -     Cancel: Lipid Panel; Future  -     Cancel: PSA, Screening; Future  -     Cancel: Protein/Creatinine Ratio, Urine; Future  -     Cologuard Screening (Multitarget Stool DNA); Future  -     Cologuard Screening (Multitarget Stool DNA)  -     Cancel: Protein/Creatinine Ratio, Urine; Future  -     PSA, Screening; Future  -     PSA, Screening  -     Protein/Creatinine Ratio, Urine; Future  -     Lipid Panel; Future  -     Lipid Panel  -     Hemoglobin A1C; Future  -     Hemoglobin A1C  -     Comprehensive Metabolic Panel; Future  -     Comprehensive Metabolic Panel    Mixed hyperlipidemia  -     Cancel: Lipid Panel; Future  -     Lipid Panel; Future  -     Lipid Panel    Essential hypertension  -     Cancel: Comprehensive Metabolic Panel; Future  -     Comprehensive Metabolic Panel; Future  -     Comprehensive Metabolic Panel    Screening for prostate cancer  -     Cancel: PSA, Screening; Future  -     PSA, Screening; Future  -     PSA, Screening    Abnormal urinalysis  -     Cancel:  Protein/Creatinine Ratio, Urine; Future  -     Cancel: Protein/Creatinine Ratio, Urine; Future  -     Protein/Creatinine Ratio, Urine; Future    Screening for colon cancer  -     Cologuard Screening (Multitarget Stool DNA); Future  -     Cologuard Screening (Multitarget Stool DNA)    Hyperglycemia  -     Cancel: Comprehensive Metabolic Panel; Future  -     Cancel: Hemoglobin A1C; Future  -     Hemoglobin A1C; Future  -     Hemoglobin A1C  -     Comprehensive Metabolic Panel; Future  -     Comprehensive Metabolic Panel    Other orders  -     Influenza - Quadrivalent (PF)      His flank pain symptoms have resolved as of past 3 days.  It is possible could have been related to constipation, musculoskeletal, could have already passed a kidney stone prior to having the CT done.  At this point symptoms resolved so would defer any further evaluation unless recurs.  Arrange above laboratory evaluation regarding his physical exam chronic medical issues.  Will check urine protein creatinine ratio due to small amount of protein urine.            Past Medical History:  Past Medical History:   Diagnosis Date    Broken nose 12/2017    Pneumonia 12/2017    Primary hypercholesterolemia     Syncope, vasovagal 12/2017     Past Surgical History:   Procedure Laterality Date    bulging disks      TONSILLECTOMY      When i was a child approx 4yrs old     Review of patient's allergies indicates:   Allergen Reactions    No known drug allergies      Current Outpatient Medications on File Prior to Visit   Medication Sig Dispense Refill    albuterol (PROVENTIL) 2.5 mg /3 mL (0.083 %) nebulizer solution Take 3 mLs (2.5 mg total) by nebulization every 6 (six) hours as needed for Wheezing. Rescue 100 each 0    albuterol (PROVENTIL/VENTOLIN HFA) 90 mcg/actuation inhaler INHALE 2 PUFFS INTO THE LUNGS EVERY 6 HOURS AS NEEDED FOR WHEEZING OR RESCUE 8.5 g 11    amLODIPine (NORVASC) 10 MG tablet TAKE 1 TABLET(10 MG) BY MOUTH EVERY DAY 90 tablet 0     atorvastatin (LIPITOR) 40 MG tablet Take 1 tablet (40 mg total) by mouth once daily. Obtain a lipid and liver panel in 3 months. 90 tablet 3    multivitamin (THERAGRAN) per tablet Take 1 tablet by mouth.      mupirocin (BACTROBAN) 2 % ointment Apply topically 3 (three) times daily. 22 g 0    ondansetron (ZOFRAN-ODT) 4 MG TbDL Take 4 mg by mouth.       No current facility-administered medications on file prior to visit.     Social History     Socioeconomic History    Marital status:    Tobacco Use    Smoking status: Former     Current packs/day: 0.00     Average packs/day: 0.1 packs/day for 4.0 years (0.4 ttl pk-yrs)     Types: Cigarettes     Start date:      Quit date:      Years since quittin.0    Smokeless tobacco: Former   Substance and Sexual Activity    Alcohol use: Yes     Alcohol/week: 5.0 standard drinks of alcohol     Types: 2 Glasses of wine, 2 Cans of beer, 1 Standard drinks or equivalent per week     Comment: Maybe 2 drinks during the week.couple more on weekend    Drug use: Never    Sexual activity: Yes     Partners: Female     Birth control/protection: None     Social Determinants of Health     Financial Resource Strain: Low Risk  (2023)    Overall Financial Resource Strain (CARDIA)     Difficulty of Paying Living Expenses: Not hard at all   Food Insecurity: No Food Insecurity (2023)    Hunger Vital Sign     Worried About Running Out of Food in the Last Year: Never true     Ran Out of Food in the Last Year: Never true   Transportation Needs: No Transportation Needs (2023)    PRAPARE - Transportation     Lack of Transportation (Medical): No     Lack of Transportation (Non-Medical): No   Physical Activity: Insufficiently Active (2023)    Exercise Vital Sign     Days of Exercise per Week: 3 days     Minutes of Exercise per Session: 40 min   Stress: No Stress Concern Present (2023)    [place] of Occupational Health - Occupational Stress  "Questionnaire     Feeling of Stress : Not at all   Social Connections: Unknown (12/26/2023)    Social Connection and Isolation Panel [NHANES]     Frequency of Communication with Friends and Family: More than three times a week     Frequency of Social Gatherings with Friends and Family: Once a week     Active Member of Clubs or Organizations: Yes     Attends Club or Organization Meetings: More than 4 times per year     Marital Status:    Housing Stability: Unknown (12/26/2023)    Housing Stability Vital Sign     Unable to Pay for Housing in the Last Year: No     Unstable Housing in the Last Year: No     Family History   Problem Relation Age of Onset    Arthritis Mother     Arthritis Maternal Grandmother     Diabetes Paternal Uncle            ROS:  GENERAL: No fever, chills,  or significant weight changes.   CARDIOVASCULAR: Denies chest pain, PND, orthopnea or reduced exercise tolerance.  ABDOMEN: Appetite fine. Denies diarrhea, hematemesis or blood in stool.  URINARY: No dysuria or hematuria.    Vitals:    12/27/23 1352   BP: 135/83   Pulse: 77   Temp: 97 °F (36.1 °C)   TempSrc: Temporal   Weight: 108 kg (238 lb 1.6 oz)   Height: 5' 7" (1.702 m)     Wt Readings from Last 3 Encounters:   12/27/23 108 kg (238 lb 1.6 oz)   05/15/23 105.7 kg (233 lb)   05/02/23 105.9 kg (233 lb 8 oz)       OBJECTIVE:   APPEARANCE: Well nourished, well developed, in no acute distress.    HEAD: Normocephalic.  Atraumatic.  No sinus tenderness.  EYES:   Right eye: Pupil reactive.  Conjunctiva clear.    Left eye: Pupil reactive.  Conjunctiva clear.  EOMI.    EARS: TM's intact. Light reflex normal. No retraction or perforation.    NOSE:  clear.  MOUTH & THROAT:  No pharyngeal erythema or exudate. No lesions.  NECK: Supple. No bruits.  No JVD.  No cervical lymphadenopathy.  No thyromegaly.    CHEST: Breath sounds clear bilaterally.  Normal respiratory effort  CARDIOVASCULAR: Normal rate.  Regular rhythm.  No murmurs.  No rub.  No " gallops.  ABDOMEN: Bowel sounds normal.  Soft.  No tenderness.  No organomegaly.  PERIPHERAL VASCULAR: No cyanosis.  No clubbing.  No edema.  NEUROLOGIC: No focal findings.  MENTAL STATUS: Alert.  Oriented x 3.

## 2023-12-27 NOTE — TELEPHONE ENCOUNTER
----- Message from Korina De La Garza sent at 12/27/2023  2:46 PM CST -----  Regarding: all labs ordered 12/27/23 please send to [facility]  Patient states at time of checkout that his labs need to go to [facility] for insurance purposes. Please send labs and urine ordered today at time of visit to [facility]. Patient states he will have them done on Jan 5.    Patient declined to schedule labs ordered at time of visit within [facility] lab processing.    Thank you

## 2023-12-27 NOTE — TELEPHONE ENCOUNTER
----- Message from Desiree Schwarz sent at 12/27/2023  2:34 PM CST -----  Lisa with [facility] called in this afternoon stating she needs the patients stat ct to be back dated for yesterday. Please call back [phone]

## 2023-12-27 NOTE — TELEPHONE ENCOUNTER
Patient appointment with me today.  I will review results from appointment with Dr. Flores with him

## 2024-01-05 DIAGNOSIS — I10 ESSENTIAL HYPERTENSION: ICD-10-CM

## 2024-01-05 LAB
ALBUMIN SERPL-MCNC: 4.2 G/DL (ref 3.6–5.1)
ALBUMIN/GLOB SERPL: 1.4 (CALC) (ref 1–2.5)
ALP SERPL-CCNC: 81 U/L (ref 35–144)
ALT SERPL-CCNC: 41 U/L (ref 9–46)
AST SERPL-CCNC: 24 U/L (ref 10–35)
BILIRUB SERPL-MCNC: 1 MG/DL (ref 0.2–1.2)
BUN SERPL-MCNC: 15 MG/DL (ref 7–25)
BUN/CREAT SERPL: ABNORMAL (CALC) (ref 6–22)
CALCIUM SERPL-MCNC: 9.1 MG/DL (ref 8.6–10.3)
CHLORIDE SERPL-SCNC: 102 MMOL/L (ref 98–110)
CHOLEST SERPL-MCNC: 199 MG/DL
CHOLEST/HDLC SERPL: 4.1 (CALC)
CO2 SERPL-SCNC: 28 MMOL/L (ref 20–32)
CREAT SERPL-MCNC: 0.86 MG/DL (ref 0.7–1.3)
CREAT UR-MCNC: 118 MG/DL (ref 20–320)
EGFR: 102 ML/MIN/1.73M2
GLOBULIN SER CALC-MCNC: 2.9 G/DL (CALC) (ref 1.9–3.7)
GLUCOSE SERPL-MCNC: 109 MG/DL (ref 65–99)
HBA1C MFR BLD: 6.7 % OF TOTAL HGB
HDLC SERPL-MCNC: 49 MG/DL
LDLC SERPL CALC-MCNC: 121 MG/DL (CALC)
NONHDLC SERPL-MCNC: 150 MG/DL (CALC)
POTASSIUM SERPL-SCNC: 4.1 MMOL/L (ref 3.5–5.3)
PROT SERPL-MCNC: 7.1 G/DL (ref 6.1–8.1)
PROT UR-MCNC: 7 MG/DL (ref 5–25)
PROT/CREAT UR: 0.06 MG/MG CREAT (ref 0.03–0.15)
PROT/CREAT UR: 59 MG/G CREAT (ref 25–148)
PSA SERPL-MCNC: 0.94 NG/ML
SODIUM SERPL-SCNC: 139 MMOL/L (ref 135–146)
TRIGL SERPL-MCNC: 171 MG/DL

## 2024-01-05 RX ORDER — AMLODIPINE BESYLATE 10 MG/1
TABLET ORAL
Qty: 90 TABLET | Refills: 0 | Status: SHIPPED | OUTPATIENT
Start: 2024-01-05

## 2024-01-05 NOTE — TELEPHONE ENCOUNTER
No care due was identified.  [facility] Embedded Care Due Messages. Reference number: [id number].   1/05/2024 3:52:06 AM CST

## 2024-01-05 NOTE — TELEPHONE ENCOUNTER
Refill Routing Note   Medication(s) are not appropriate for processing by [facility] for the following reason(s):        Patient not seen by provider within 15 months    [facility] action(s):  Defer               Appointments  past 12m or future 3m with PCP    Date Provider   Last Visit   8/29/2022 Lex Whipple MD   Next Visit   Visit date not found Lex Whipple MD   ED visits in past 90 days: 0        Note composed:7:49 AM 01/05/2024

## 2024-01-08 RX ORDER — ATORVASTATIN CALCIUM 40 MG/1
TABLET, FILM COATED ORAL
Qty: 90 TABLET | Refills: 3 | Status: SHIPPED | OUTPATIENT
Start: 2024-01-08

## 2024-01-08 NOTE — TELEPHONE ENCOUNTER
Refill Routing Note   Medication(s) are not appropriate for processing by [facility] for the following reason(s):        Patient not seen by provider within 15 months    [facility] action(s):  Defer               Appointments  past 12m or future 3m with PCP    Date Provider   Last Visit   8/29/2022 Lex Whipple MD   Next Visit   Visit date not found Lex Whipple MD   ED visits in past 90 days: 0        Note composed:12:07 PM 01/08/2024

## 2024-01-22 ENCOUNTER — TELEPHONE (OUTPATIENT)
Dept: FAMILY MEDICINE | Facility: CLINIC | Age: 54
End: 2024-01-22
Payer: COMMERCIAL

## 2024-01-22 DIAGNOSIS — R73.9 ELEVATED BLOOD SUGAR: Primary | ICD-10-CM

## 2024-01-22 NOTE — TELEPHONE ENCOUNTER
----- Message from David Weldon MD sent at 1/22/2024 12:27 PM CST -----  Cholesterol borderline, not high enough to require medication at this time.  Sugar was slightly elevated as well as his hemoglobin A1c being elevated.  Getting towards the diabetic range, but would need more than 1 check to make that diagnosis.  Recommend watch diet, try to lose weight.  Recheck fasting glucose, hemoglobin A1c 3 months.

## 2024-03-16 LAB — NONINV COLON CA DNA+OCC BLD SCRN STL QL: NEGATIVE

## 2024-04-01 DIAGNOSIS — I10 ESSENTIAL HYPERTENSION: ICD-10-CM

## 2024-04-01 NOTE — TELEPHONE ENCOUNTER
No care due was identified.  [facility] Embedded Care Due Messages. Reference number: [id number].   4/01/2024 3:52:08 AM CDT

## 2024-04-02 RX ORDER — AMLODIPINE BESYLATE 10 MG/1
TABLET ORAL
Qty: 90 TABLET | Refills: 11 | Status: SHIPPED | OUTPATIENT
Start: 2024-04-02

## 2024-04-02 NOTE — TELEPHONE ENCOUNTER
The patient requested medicine refills and I did refill it x 1 year.  Message the patient to notify of any health maintenance care gaps that need to be arranged.   Health Maintenance Due   Topic Date Due    COVID-19 Vaccine (4 - 2023-24 season) 09/01/2023     BP Readings from Last 3 Encounters:   12/27/23 135/83   05/15/23 138/82   05/02/23 138/82     Lab Results   Component Value Date    HGBA1C 6.7 (H) 01/04/2024

## 2024-04-02 NOTE — TELEPHONE ENCOUNTER
Refill Routing Note   Medication(s) are not appropriate for processing by [facility] for the following reason(s):        Patient not seen by provider within 15 months    [facility] action(s):  Defer               Appointments  past 12m or future 3m with PCP    Date Provider   Last Visit   8/29/2022 Lex Whipple MD   Next Visit   Visit date not found Lex Whipple MD   ED visits in past 90 days: 0        Note composed:4:59 AM 04/02/2024

## 2024-07-29 ENCOUNTER — E-VISIT (OUTPATIENT)
Dept: FAMILY MEDICINE | Facility: CLINIC | Age: 54
End: 2024-07-29
Payer: COMMERCIAL

## 2024-07-29 DIAGNOSIS — R09.81 SINUS CONGESTION: Primary | ICD-10-CM

## 2024-07-30 RX ORDER — METHYLPREDNISOLONE 4 MG/1
TABLET ORAL
Qty: 21 TABLET | Refills: 0 | Status: SHIPPED | OUTPATIENT
Start: 2024-07-30

## 2024-07-30 RX ORDER — AZITHROMYCIN 250 MG/1
TABLET, FILM COATED ORAL
Qty: 6 TABLET | Refills: 0 | Status: SHIPPED | OUTPATIENT
Start: 2024-07-30

## 2024-07-30 NOTE — PROGRESS NOTES
Patient ID: Rupert Mir is a 53 y.o. male.    Chief Complaint: URI (Entered automatically based on patient selection in eROI.)    The patient initiated a request through eROI on 7/29/2024 for evaluation and management with a chief complaint of URI (Entered automatically based on patient selection in eROI.)     I evaluated the questionnaire submission on 07/30/2024.    [facility] E-Visit Covid    7/29/2024 12:03 PM CDT - Filed by Patient   Do you agree to participate in an E-Visit? Yes   If you have any of the following symptoms, go to your local emergency room or call 911: I acknowledge   What is the main issue you would like addressed today? Congestion   Do you think you might have COVID or the Flu? No   Have you tested positive for COVID or Flu? No   What symptoms do you currently have?  Cough;  Fatigue;  Headache;  Nasal Congestion;  Sore throat   Describe your cough: Dry   Have you had any of the following? None of the above   Have you ever smoked? I have never smoked   Have you had a fever? No   When did your symptoms first appear? 7/26/2024   In the last two weeks, have you been in close contact with someone who has COVID-19 or the Flu? No   List what you have done or taken to help your symptoms. Irrigated my sinuses with simple saline, used Flonase, and my inhaler.   How severe are your symptoms? Mild   Have your symptoms gotten better or worse since they started?  Worse   Do you have transportation to get testing if it is needed and ordered for you at an [facility] location? Yes   Provide any additional information you feel is important. My wife has a sinus infection with fever, my son has post nasal drip with cough, my mom has postnasal drip with a cough and dad is in the hospital with pneumonia. ; None of us has tested positive for Covid. ;  My wife just went to the urgent care and was put on a steroid and antibiotics.   Please attach any relevant images or files    Are you able to take your  vital signs? Yes   Systolic Blood Pressure: 138   Diastolic Blood Pressure: 86   Weight: 240   Height: 68   Pulse: 75   Temperature: 97.7   Respiration rate: 16   Pulse Oxygen: 98         Encounter Diagnosis   Name Primary?    Sinus congestion Yes        No orders of the defined types were placed in this encounter.     Medications Ordered This Encounter   Medications    azithromycin (Z-HILARIO) 250 MG tablet     Sig: Take as directed.     Dispense:  6 tablet     Refill:  0    methylPREDNISolone (MEDROL DOSEPACK) 4 mg tablet     Sig: follow package directions     Dispense:  21 tablet     Refill:  0        No follow-ups on file.      E-Visit Time Tracking:    Day 1 Time (in minutes): 5    Total Time (in minutes): 5

## 2024-09-04 ENCOUNTER — OFFICE VISIT (OUTPATIENT)
Dept: FAMILY MEDICINE | Facility: CLINIC | Age: 54
End: 2024-09-04
Payer: COMMERCIAL

## 2024-09-04 VITALS
OXYGEN SATURATION: 95 % | TEMPERATURE: 98 F | SYSTOLIC BLOOD PRESSURE: 128 MMHG | HEART RATE: 88 BPM | BODY MASS INDEX: 38.32 KG/M2 | WEIGHT: 244.13 LBS | DIASTOLIC BLOOD PRESSURE: 77 MMHG | HEIGHT: 67 IN

## 2024-09-04 DIAGNOSIS — E66.01 SEVERE OBESITY (BMI 35.0-39.9) WITH COMORBIDITY: ICD-10-CM

## 2024-09-04 DIAGNOSIS — H66.91 RIGHT OTITIS MEDIA, UNSPECIFIED OTITIS MEDIA TYPE: ICD-10-CM

## 2024-09-04 DIAGNOSIS — R05.8 POST-VIRAL COUGH SYNDROME: Primary | ICD-10-CM

## 2024-09-04 DIAGNOSIS — R73.03 PREDIABETES: ICD-10-CM

## 2024-09-04 DIAGNOSIS — R63.5 EXCESSIVE WEIGHT GAIN: ICD-10-CM

## 2024-09-04 PROCEDURE — 3074F SYST BP LT 130 MM HG: CPT | Mod: CPTII,S$GLB,, | Performed by: FAMILY MEDICINE

## 2024-09-04 PROCEDURE — G2211 COMPLEX E/M VISIT ADD ON: HCPCS | Mod: S$GLB,,, | Performed by: FAMILY MEDICINE

## 2024-09-04 PROCEDURE — 3078F DIAST BP <80 MM HG: CPT | Mod: CPTII,S$GLB,, | Performed by: FAMILY MEDICINE

## 2024-09-04 PROCEDURE — 99999 PR PBB SHADOW E&M-EST. PATIENT-LVL IV: CPT | Mod: PBBFAC,,, | Performed by: FAMILY MEDICINE

## 2024-09-04 PROCEDURE — 3008F BODY MASS INDEX DOCD: CPT | Mod: CPTII,S$GLB,, | Performed by: FAMILY MEDICINE

## 2024-09-04 PROCEDURE — 3044F HG A1C LEVEL LT 7.0%: CPT | Mod: CPTII,S$GLB,, | Performed by: FAMILY MEDICINE

## 2024-09-04 PROCEDURE — 99214 OFFICE O/P EST MOD 30 MIN: CPT | Mod: S$GLB,,, | Performed by: FAMILY MEDICINE

## 2024-09-04 PROCEDURE — 1159F MED LIST DOCD IN RCRD: CPT | Mod: CPTII,S$GLB,, | Performed by: FAMILY MEDICINE

## 2024-09-04 RX ORDER — MONTELUKAST SODIUM 10 MG/1
10 TABLET ORAL NIGHTLY
Qty: 30 TABLET | Refills: 0 | Status: SHIPPED | OUTPATIENT
Start: 2024-09-04 | End: 2024-10-04

## 2024-09-04 RX ORDER — DOXYCYCLINE 100 MG/1
100 CAPSULE ORAL 2 TIMES DAILY
Qty: 20 CAPSULE | Refills: 0 | Status: SHIPPED | OUTPATIENT
Start: 2024-09-04 | End: 2024-09-14

## 2024-09-04 RX ORDER — FLUTICASONE PROPIONATE 50 MCG
1 SPRAY, SUSPENSION (ML) NASAL DAILY
COMMUNITY

## 2024-09-04 NOTE — PROGRESS NOTES
History of Present Illness    Mr. Mir developed an upper respiratory illness illness a few days before the end of July, for which he sought an e-visit. He had severe congestion and was prescribed a Dosepak and azithromycin. While initial symptoms improved, a residual cough has persisted for over a month. The cough is intermittent, sometimes allowing uninterrupted speech, but at other times interfering with conversation. It worsens with talking and improves when quiet or engaged in activities like typing or writing.    The patient has occasional sinus problems over the years and has used an albuterol inhaler intermittently since 2018, particularly respiratory illness.  He also reports coughing during winter and early spring coinciding with plant blooming.    The patient has intermittent fluid in his right ear, possibly starting around the time of his July illness.     The patient is concerned about significant weight gain, currently weighing 244 lbs, his highest weight to date. The weight gain began during the pandemic, approximately 2 years ago.  He did have elevated glucose in January and was supposed to have follow-up laboratory done.  Has not been diagnosed with diabetes.    The patient denies fever, wheezing, heartburn, acid reflux, asthma diagnosis, and pain in his right ear.    ROS:  Constitutional: -fevers, +weight gain  Respiratory: +cough, -wheezing  Gastrointestinal: -heartburn          Rupert was seen today for cough and foot pain.    Diagnoses and all orders for this visit:    Post-viral cough syndrome    Right otitis media, unspecified otitis media type    Severe obesity (BMI 35.0-39.9) with comorbidity  -     Glucose, Fasting; Future  -     Hemoglobin A1C; Future  -     TSH; Future  -     Glucose, Fasting  -     Hemoglobin A1C  -     TSH    Prediabetes  -     Glucose, Fasting; Future  -     Hemoglobin A1C; Future  -     Glucose, Fasting  -     Hemoglobin A1C    Excessive weight gain  -      TSH; Future  -     TSH    Other orders  -     montelukast (SINGULAIR) 10 mg tablet; Take 1 tablet (10 mg total) by mouth every evening.  -     doxycycline (MONODOX) 100 MG capsule; Take 1 capsule (100 mg total) by mouth 2 (two) times daily. for 10 days        PERSISTENT COUGH:  - Assessed persistent cough following recent illness, likely viral in origin.  - Considered possibility of mild asthma, given history of using inhaler intermittently.  - Mr. Mir to monitor for improvement in cough and ear symptoms.  - Follow up if symptoms persists beyond 1 month.  PREDIABETES:  - Reviewed weight gain and previous lab results indicating prediabetes.  - Will recheck fasting blood sugar and hemoglobin A1C to reassess diabetes status.  - Explained the relationship between weight, diet, and diabetes management.  - Discussed the concept of prediabetes and potential progression to diabetes.  - Continued albuterol inhaler as needed.  - Continued Flonase nasal spray.  LABS:  - Ordered fasting labs at [facility]: fasting glucose, hemoglobin A1C, thyroid function tests.  FOLLOW UP:  - Follow up after labs results for further management, especially if diabetes is confirmed.               Past Medical History:  Past Medical History:   Diagnosis Date    Broken nose 12/2017    Pneumonia 12/2017    Primary hypercholesterolemia     Syncope, vasovagal 12/2017     Past Surgical History:   Procedure Laterality Date    bulging disks      TONSILLECTOMY      When i was a child approx 4yrs old     Review of patient's allergies indicates:   Allergen Reactions    No known drug allergies      Current Outpatient Medications on File Prior to Visit   Medication Sig Dispense Refill    albuterol (PROVENTIL/VENTOLIN HFA) 90 mcg/actuation inhaler INHALE 2 PUFFS INTO THE LUNGS EVERY 6 HOURS AS NEEDED FOR WHEEZING OR RESCUE 8.5 g 11    amLODIPine (NORVASC) 10 MG tablet TAKE 1 TABLET(10 MG) BY MOUTH EVERY DAY 90 tablet 11    atorvastatin (LIPITOR)  40 MG tablet TAKE 1 TABLET BY MOUTH EVERY DAY. OBTAIN A LIPID AND LIVER PANEL IN 3 MONTHS 90 tablet 3    fluticasone propionate (FLONASE) 50 mcg/actuation nasal spray 1 spray by Each Nostril route once daily.      multivitamin (THERAGRAN) per tablet Take 1 tablet by mouth.      [DISCONTINUED] azithromycin (Z-HILARIO) 250 MG tablet Take as directed. 6 tablet 0    [DISCONTINUED] methylPREDNISolone (MEDROL DOSEPACK) 4 mg tablet follow package directions 21 tablet 0     No current facility-administered medications on file prior to visit.     Social History     Socioeconomic History    Marital status:    Tobacco Use    Smoking status: Former     Current packs/day: 0.00     Average packs/day: 0.1 packs/day for 4.0 years (0.4 ttl pk-yrs)     Types: Cigarettes     Start date:      Quit date:      Years since quittin.6    Smokeless tobacco: Former   Substance and Sexual Activity    Alcohol use: Yes     Alcohol/week: 5.0 standard drinks of alcohol     Types: 2 Glasses of wine, 2 Cans of beer, 1 Standard drinks or equivalent per week     Comment: Maybe 2 drinks during the week.couple more on weekend    Drug use: Never    Sexual activity: Yes     Partners: Female     Birth control/protection: None     Social Determinants of Health     Financial Resource Strain: Low Risk  (2023)    Overall Financial Resource Strain (CARDIA)     Difficulty of Paying Living Expenses: Not hard at all   Food Insecurity: No Food Insecurity (2023)    Hunger Vital Sign     Worried About Running Out of Food in the Last Year: Never true     Ran Out of Food in the Last Year: Never true   Transportation Needs: No Transportation Needs (2023)    PRAPARE - Transportation     Lack of Transportation (Medical): No     Lack of Transportation (Non-Medical): No   Physical Activity: Insufficiently Active (2023)    Exercise Vital Sign     Days of Exercise per Week: 3 days     Minutes of Exercise per Session: 40 min   Stress: No  "Stress Concern Present (12/26/2023)    [place] [employer] - Occupational Stress Questionnaire     Feeling of Stress : Not at all   Housing Stability: Unknown (12/26/2023)    Housing Stability Vital Sign     Unable to Pay for Housing in the Last Year: No     Unstable Housing in the Last Year: No     Family History   Problem Relation Name Age of Onset    Arthritis Mother Yvonne Bacon     Arthritis Maternal Grandmother Justina Ziegler     Diabetes Paternal Uncle Neftaly Mir            ROS:  GENERAL: No fever, chills,  or significant weight changes.   CARDIOVASCULAR: Denies chest pain, PND, orthopnea or reduced exercise tolerance.  ABDOMEN: Appetite fine. Denies diarrhea, abdominal pain, hematemesis or blood in stool.  URINARY: No flank pain, dysuria or hematuria.    Vitals:    09/04/24 1548   BP: 128/77   Pulse: 88   Temp: 97.9 °F (36.6 °C)   TempSrc: Temporal   SpO2: 95%   Weight: 110.7 kg (244 lb 1.6 oz)   Height: 5' 7" (1.702 m)     Wt Readings from Last 3 Encounters:   09/04/24 110.7 kg (244 lb 1.6 oz)   12/27/23 108 kg (238 lb 1.6 oz)   05/15/23 105.7 kg (233 lb)       OBJECTIVE:   APPEARANCE: Well nourished, well developed, in no acute distress.    HEAD: Normocephalic.  Atraumatic.  No sinus tenderness.  EYES:   Right eye: Pupil reactive.  Conjunctiva clear.    Left eye: Pupil reactive.  Conjunctiva clear.  EOMI.    EARS: TM's intact.  He has effusion on the right with mild. No retraction or perforation.    NOSE:  clear.  MOUTH & THROAT:  No pharyngeal erythema or exudate. No lesions.  NECK: Supple. No bruits.  No JVD.  No cervical lymphadenopathy.  No thyromegaly.    CHEST: Breath sounds clear bilaterally.  Normal respiratory effort  CARDIOVASCULAR: Normal rate.  Regular rhythm.  No murmurs.  No rub.  No gallops.  ABDOMEN: Bowel sounds normal.  Soft.  No tenderness.  No organomegaly.  PERIPHERAL VASCULAR: No cyanosis.  No clubbing.  No edema.  NEUROLOGIC: No focal findings.  MENTAL STATUS: " Alert.  Oriented x 3.

## 2024-09-04 NOTE — TELEPHONE ENCOUNTER
No care due was identified.  [facility] Embedded Care Due Messages. Reference number: [id number].   9/04/2024 5:21:46 PM CDT

## 2024-09-05 RX ORDER — MONTELUKAST SODIUM 10 MG/1
10 TABLET ORAL NIGHTLY
Qty: 90 TABLET | OUTPATIENT
Start: 2024-09-05

## 2024-09-05 NOTE — TELEPHONE ENCOUNTER
Refill Decision Note   Tanyaquelin Adeola  is requesting a refill authorization.  Brief Assessment and Rationale for Refill:  Quick Discontinue     Medication Therapy Plan:         Comments:     Note composed:9:09 AM 09/05/2024

## 2024-09-11 ENCOUNTER — PATIENT MESSAGE (OUTPATIENT)
Dept: FAMILY MEDICINE | Facility: CLINIC | Age: 54
End: 2024-09-11
Payer: COMMERCIAL

## 2024-10-01 RX ORDER — MONTELUKAST SODIUM 10 MG/1
10 TABLET ORAL NIGHTLY
Qty: 90 TABLET | Refills: 0 | Status: SHIPPED | OUTPATIENT
Start: 2024-10-01

## 2024-10-01 NOTE — TELEPHONE ENCOUNTER
Refill Routing Note   Medication(s) are not appropriate for processing by [facility] for the following reason(s):        Patient not seen by provider within 15 months  No active prescription written by provider    [facility] action(s):  Defer               Appointments  past 12m or future 3m with PCP    Date Provider   Last Visit   7/29/2024 Lex Whipple MD   Next Visit   Visit date not found Lex Whipple MD   ED visits in past 90 days: 0        Note composed:12:55 PM 10/01/2024

## 2024-10-01 NOTE — TELEPHONE ENCOUNTER
No care due was identified.  [facility] Embedded Care Due Messages. Reference number: [id number].   10/01/2024 3:52:04 AM CDT

## 2025-05-30 LAB
LEFT EYE DM RETINOPATHY: NEGATIVE
RIGHT EYE DM RETINOPATHY: NEGATIVE

## 2025-06-13 ENCOUNTER — PATIENT OUTREACH (OUTPATIENT)
Dept: ADMINISTRATIVE | Facility: HOSPITAL | Age: 55
End: 2025-06-13
Payer: COMMERCIAL

## 2025-08-18 ENCOUNTER — TELEPHONE (OUTPATIENT)
Dept: PHARMACY | Facility: CLINIC | Age: 55
End: 2025-08-18
Payer: COMMERCIAL

## 2025-08-18 RX ORDER — TIRZEPATIDE 2.5 MG/.5ML
2.5 INJECTION, SOLUTION SUBCUTANEOUS
COMMUNITY
Start: 2025-07-26